# Patient Record
Sex: MALE | Race: BLACK OR AFRICAN AMERICAN | NOT HISPANIC OR LATINO | Employment: FULL TIME | URBAN - METROPOLITAN AREA
[De-identification: names, ages, dates, MRNs, and addresses within clinical notes are randomized per-mention and may not be internally consistent; named-entity substitution may affect disease eponyms.]

---

## 2017-08-09 ENCOUNTER — HOSPITAL ENCOUNTER (EMERGENCY)
Facility: OTHER | Age: 22
Discharge: HOME | End: 2017-08-09
Attending: INTERNAL MEDICINE
Payer: MEDICAID

## 2017-08-09 VITALS
HEART RATE: 66 BPM | OXYGEN SATURATION: 100 % | WEIGHT: 235 LBS | RESPIRATION RATE: 18 BRPM | SYSTOLIC BLOOD PRESSURE: 113 MMHG | DIASTOLIC BLOOD PRESSURE: 78 MMHG | HEIGHT: 67 IN | BODY MASS INDEX: 36.88 KG/M2 | TEMPERATURE: 98 F

## 2017-08-09 DIAGNOSIS — S60.221A CONTUSION OF RIGHT HAND, INITIAL ENCOUNTER: ICD-10-CM

## 2017-08-09 DIAGNOSIS — S63.91XA HAND SPRAIN, RIGHT, INITIAL ENCOUNTER: Primary | ICD-10-CM

## 2017-08-09 PROCEDURE — 25000003 PHARM REV CODE 250: Performed by: INTERNAL MEDICINE

## 2017-08-09 PROCEDURE — 99283 EMERGENCY DEPT VISIT LOW MDM: CPT

## 2017-08-09 RX ORDER — IBUPROFEN 800 MG/1
800 TABLET ORAL EVERY 8 HOURS PRN
Qty: 30 TABLET | Refills: 0 | OUTPATIENT
Start: 2017-08-09 | End: 2020-01-28

## 2017-08-09 RX ORDER — IBUPROFEN 400 MG/1
800 TABLET ORAL
Status: COMPLETED | OUTPATIENT
Start: 2017-08-09 | End: 2017-08-09

## 2017-08-09 RX ADMIN — IBUPROFEN 800 MG: 400 TABLET, FILM COATED ORAL at 09:08
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
## 2017-11-22 ENCOUNTER — HOSPITAL ENCOUNTER (EMERGENCY)
Facility: HOSPITAL | Age: 22
Discharge: HOME | End: 2017-11-23
Attending: EMERGENCY MEDICINE
Payer: MEDICAID

## 2017-11-22 DIAGNOSIS — G43.009 MIGRAINE WITHOUT AURA AND WITHOUT STATUS MIGRAINOSUS, NOT INTRACTABLE: Primary | ICD-10-CM

## 2017-11-22 PROCEDURE — 85025 COMPLETE CBC W/AUTO DIFF WBC: CPT

## 2017-11-22 PROCEDURE — 96360 HYDRATION IV INFUSION INIT: CPT

## 2017-11-22 PROCEDURE — 80053 COMPREHEN METABOLIC PANEL: CPT

## 2017-11-22 PROCEDURE — 83690 ASSAY OF LIPASE: CPT

## 2017-11-22 PROCEDURE — 99284 EMERGENCY DEPT VISIT MOD MDM: CPT | Mod: 25

## 2017-11-23 VITALS
OXYGEN SATURATION: 99 % | BODY MASS INDEX: 36.88 KG/M2 | HEIGHT: 67 IN | DIASTOLIC BLOOD PRESSURE: 79 MMHG | SYSTOLIC BLOOD PRESSURE: 133 MMHG | WEIGHT: 235 LBS | RESPIRATION RATE: 18 BRPM | TEMPERATURE: 100 F | HEART RATE: 78 BPM

## 2017-11-23 LAB
ALBUMIN SERPL BCP-MCNC: 4 G/DL
ALP SERPL-CCNC: 100 U/L
ALT SERPL W/O P-5'-P-CCNC: 59 U/L
AMPHET+METHAMPHET UR QL: NEGATIVE
ANION GAP SERPL CALC-SCNC: 10 MMOL/L
AST SERPL-CCNC: 33 U/L
BARBITURATES UR QL SCN>200 NG/ML: NEGATIVE
BASOPHILS # BLD AUTO: 0.02 K/UL
BASOPHILS NFR BLD: 0.2 %
BENZODIAZ UR QL SCN>200 NG/ML: NEGATIVE
BILIRUB SERPL-MCNC: 1.3 MG/DL
BILIRUB UR QL STRIP: NEGATIVE
BUN SERPL-MCNC: 11 MG/DL
BZE UR QL SCN: NEGATIVE
CALCIUM SERPL-MCNC: 9.3 MG/DL
CANNABINOIDS UR QL SCN: NORMAL
CHLORIDE SERPL-SCNC: 105 MMOL/L
CLARITY UR: CLEAR
CO2 SERPL-SCNC: 28 MMOL/L
COLOR UR: YELLOW
CREAT SERPL-MCNC: 1.2 MG/DL
CREAT UR-MCNC: 208.8 MG/DL
DIFFERENTIAL METHOD: ABNORMAL
EOSINOPHIL # BLD AUTO: 0.1 K/UL
EOSINOPHIL NFR BLD: 0.7 %
ERYTHROCYTE [DISTWIDTH] IN BLOOD BY AUTOMATED COUNT: 14.6 %
EST. GFR  (AFRICAN AMERICAN): >60 ML/MIN/1.73 M^2
EST. GFR  (NON AFRICAN AMERICAN): >60 ML/MIN/1.73 M^2
FLUAV AG SPEC QL IA: NEGATIVE
FLUBV AG SPEC QL IA: NEGATIVE
GLUCOSE SERPL-MCNC: 82 MG/DL
GLUCOSE UR QL STRIP: NEGATIVE
HCT VFR BLD AUTO: 46.2 %
HGB BLD-MCNC: 15.5 G/DL
HGB UR QL STRIP: NEGATIVE
KETONES UR QL STRIP: NEGATIVE
LEUKOCYTE ESTERASE UR QL STRIP: NEGATIVE
LIPASE SERPL-CCNC: 22 U/L
LYMPHOCYTES # BLD AUTO: 1.6 K/UL
LYMPHOCYTES NFR BLD: 14.6 %
MCH RBC QN AUTO: 27.8 PG
MCHC RBC AUTO-ENTMCNC: 33.5 G/DL
MCV RBC AUTO: 83 FL
METHADONE UR QL SCN>300 NG/ML: NEGATIVE
MONOCYTES # BLD AUTO: 0.9 K/UL
MONOCYTES NFR BLD: 8.5 %
NEUTROPHILS # BLD AUTO: 8.3 K/UL
NEUTROPHILS NFR BLD: 75.8 %
NITRITE UR QL STRIP: NEGATIVE
OPIATES UR QL SCN: NEGATIVE
PCP UR QL SCN>25 NG/ML: NEGATIVE
PH UR STRIP: 6 [PH] (ref 5–8)
PLATELET # BLD AUTO: 187 K/UL
PMV BLD AUTO: 11.8 FL
POTASSIUM SERPL-SCNC: 4 MMOL/L
PROT SERPL-MCNC: 7.3 G/DL
PROT UR QL STRIP: NEGATIVE
RBC # BLD AUTO: 5.57 M/UL
SODIUM SERPL-SCNC: 143 MMOL/L
SP GR UR STRIP: 1.02 (ref 1–1.03)
SPECIMEN SOURCE: NORMAL
TOXICOLOGY INFORMATION: NORMAL
URN SPEC COLLECT METH UR: ABNORMAL
UROBILINOGEN UR STRIP-ACNC: ABNORMAL EU/DL
WBC # BLD AUTO: 10.99 K/UL

## 2017-11-23 PROCEDURE — 87400 INFLUENZA A/B EACH AG IA: CPT

## 2017-11-23 PROCEDURE — 80307 DRUG TEST PRSMV CHEM ANLYZR: CPT

## 2017-11-23 PROCEDURE — 25000003 PHARM REV CODE 250: Performed by: EMERGENCY MEDICINE

## 2017-11-23 PROCEDURE — 81003 URINALYSIS AUTO W/O SCOPE: CPT

## 2017-11-23 RX ORDER — ONDANSETRON 4 MG/1
4 TABLET, ORALLY DISINTEGRATING ORAL
Status: DISCONTINUED | OUTPATIENT
Start: 2017-11-23 | End: 2017-11-23

## 2017-11-23 RX ADMIN — SODIUM CHLORIDE 1000 ML: 0.9 INJECTION, SOLUTION INTRAVENOUS at 12:11
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
## 2017-11-23 NOTE — DISCHARGE INSTRUCTIONS
Your blood work did not show any serious abnormalities and her symptoms improved with IV hydration.  It is very important to drink water to stay hydrated.  When you're having a headache you can take over-the-counter Tylenol or ibuprofen.  Make sure to have Food in your stomach if you are taking ibuprofen.  It is very important to make an appointment with a primary care physician for a full checkup.  Return to the ER for any new or worsening symptoms such as numbness, weakness, difficulty with speech, or fever

## 2019-06-08 ENCOUNTER — HOSPITAL ENCOUNTER (EMERGENCY)
Facility: HOSPITAL | Age: 24
Discharge: HOME | End: 2019-06-08
Attending: EMERGENCY MEDICINE

## 2019-06-08 VITALS
DIASTOLIC BLOOD PRESSURE: 76 MMHG | OXYGEN SATURATION: 100 % | HEART RATE: 62 BPM | RESPIRATION RATE: 18 BRPM | HEIGHT: 67 IN | WEIGHT: 240 LBS | BODY MASS INDEX: 37.67 KG/M2 | SYSTOLIC BLOOD PRESSURE: 136 MMHG | TEMPERATURE: 99 F

## 2019-06-08 DIAGNOSIS — S60.222A CONTUSION OF LEFT HAND, INITIAL ENCOUNTER: ICD-10-CM

## 2019-06-08 DIAGNOSIS — M54.50 ACUTE LEFT-SIDED LOW BACK PAIN WITHOUT SCIATICA: ICD-10-CM

## 2019-06-08 DIAGNOSIS — S01.81XA FACIAL LACERATION, INITIAL ENCOUNTER: Primary | ICD-10-CM
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
## 2019-06-08 DIAGNOSIS — T14.8XXA CONTUSION: ICD-10-CM

## 2019-06-08 PROCEDURE — 63600175 PHARM REV CODE 636 W HCPCS: Performed by: PHYSICIAN ASSISTANT

## 2019-06-08 PROCEDURE — 90715 TDAP VACCINE 7 YRS/> IM: CPT | Performed by: PHYSICIAN ASSISTANT

## 2019-06-08 PROCEDURE — 99284 EMERGENCY DEPT VISIT MOD MDM: CPT | Mod: 25

## 2019-06-08 PROCEDURE — 25000003 PHARM REV CODE 250: Performed by: PHYSICIAN ASSISTANT

## 2019-06-08 PROCEDURE — 96372 THER/PROPH/DIAG INJ SC/IM: CPT

## 2019-06-08 PROCEDURE — 12011 RPR F/E/E/N/L/M 2.5 CM/<: CPT | Mod: 59

## 2019-06-08 PROCEDURE — 90471 IMMUNIZATION ADMIN: CPT | Performed by: PHYSICIAN ASSISTANT

## 2019-06-08 RX ORDER — ACETAMINOPHEN 500 MG
1000 TABLET ORAL
Status: COMPLETED | OUTPATIENT
Start: 2019-06-08 | End: 2019-06-08

## 2019-06-08 RX ORDER — ORPHENADRINE CITRATE 30 MG/ML
60 INJECTION INTRAMUSCULAR; INTRAVENOUS
Status: COMPLETED | OUTPATIENT
Start: 2019-06-08 | End: 2019-06-08

## 2019-06-08 RX ORDER — METHOCARBAMOL 500 MG/1
1000 TABLET, FILM COATED ORAL 3 TIMES DAILY
Qty: 30 TABLET | Refills: 0 | Status: SHIPPED | OUTPATIENT
Start: 2019-06-08 | End: 2019-06-13

## 2019-06-08 RX ADMIN — CLOSTRIDIUM TETANI TOXOID ANTIGEN (FORMALDEHYDE INACTIVATED), CORYNEBACTERIUM DIPHTHERIAE TOXOID ANTIGEN (FORMALDEHYDE INACTIVATED), BORDETELLA PERTUSSIS TOXOID ANTIGEN (GLUTARALDEHYDE INACTIVATED), BORDETELLA PERTUSSIS FILAMENTOUS HEMAGGLUTININ ANTIGEN (FORMALDEHYDE INACTIVATED), BORDETELLA PERTUSSIS PERTACTIN ANTIGEN, AND BORDETELLA PERTUSSIS FIMBRIAE 2/3 ANTIGEN 0.5 ML: 5; 2; 2.5; 5; 3; 5 INJECTION, SUSPENSION INTRAMUSCULAR at 12:06

## 2019-06-08 RX ADMIN — ACETAMINOPHEN 1000 MG: 500 TABLET, FILM COATED ORAL at 12:06

## 2019-06-08 RX ADMIN — ORPHENADRINE CITRATE 60 MG: 60 INJECTION INTRAMUSCULAR; INTRAVENOUS at 02:06

## 2019-06-08 RX ADMIN — LIDOCAINE-EPINEPHRINE-TETRACAINE GEL 4-0.05-0.5%: 4-0.05-0.5 GEL at 12:06

## 2019-06-08 NOTE — DISCHARGE INSTRUCTIONS
Keep the cut dry and clean.  Watch for signs of infection including redness, swelling, drainage, fever chills.  Tylenol or Motrin for pain if needed.  Use the ice or heat pack to your back.
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
## 2020-01-28 ENCOUNTER — HOSPITAL ENCOUNTER (EMERGENCY)
Facility: HOSPITAL | Age: 25
Discharge: HOME | End: 2020-01-28
Attending: EMERGENCY MEDICINE
Payer: MEDICAID

## 2020-01-28 VITALS
SYSTOLIC BLOOD PRESSURE: 136 MMHG | WEIGHT: 245 LBS | BODY MASS INDEX: 38.45 KG/M2 | OXYGEN SATURATION: 98 % | RESPIRATION RATE: 18 BRPM | DIASTOLIC BLOOD PRESSURE: 83 MMHG | HEIGHT: 67 IN | HEART RATE: 61 BPM | TEMPERATURE: 99 F
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
## 2020-01-28 DIAGNOSIS — R03.0 ELEVATED BLOOD PRESSURE READING: ICD-10-CM

## 2020-01-28 DIAGNOSIS — M79.601 RIGHT ARM PAIN: ICD-10-CM

## 2020-01-28 PROCEDURE — 99284 EMERGENCY DEPT VISIT MOD MDM: CPT | Mod: 25

## 2020-01-28 PROCEDURE — 63600175 PHARM REV CODE 636 W HCPCS: Performed by: PHYSICIAN ASSISTANT

## 2020-01-28 PROCEDURE — 96372 THER/PROPH/DIAG INJ SC/IM: CPT | Mod: 59

## 2020-01-28 RX ORDER — METHOCARBAMOL 500 MG/1
1000 TABLET, FILM COATED ORAL 3 TIMES DAILY
Qty: 30 TABLET | Refills: 0 | Status: SHIPPED | OUTPATIENT
Start: 2020-01-28 | End: 2020-02-02

## 2020-01-28 RX ORDER — KETOROLAC TROMETHAMINE 30 MG/ML
30 INJECTION, SOLUTION INTRAMUSCULAR; INTRAVENOUS
Status: COMPLETED | OUTPATIENT
Start: 2020-01-28 | End: 2020-01-28

## 2020-01-28 RX ORDER — IBUPROFEN 600 MG/1
600 TABLET ORAL EVERY 6 HOURS PRN
Qty: 20 TABLET | Refills: 0 | OUTPATIENT
Start: 2020-01-28 | End: 2021-02-02

## 2020-01-28 RX ADMIN — KETOROLAC TROMETHAMINE 30 MG: 30 INJECTION, SOLUTION INTRAMUSCULAR; INTRAVENOUS at 11:01

## 2020-01-28 NOTE — DISCHARGE INSTRUCTIONS
Rest.  Apply ice or heat to the areas for pain relief.  Return to the ED for any changing or concerning symptoms.  Please follow up with your PCP.
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
## 2020-07-09 ENCOUNTER — HOSPITAL ENCOUNTER (EMERGENCY)
Facility: HOSPITAL | Age: 25
Discharge: HOME | End: 2020-07-10
Attending: EMERGENCY MEDICINE
Payer: MEDICAID

## 2020-07-09 DIAGNOSIS — S89.91XA RIGHT KNEE INJURY: ICD-10-CM

## 2020-07-09 DIAGNOSIS — M25.561 ACUTE PAIN OF RIGHT KNEE: Primary | ICD-10-CM

## 2020-07-09 PROCEDURE — 96372 THER/PROPH/DIAG INJ SC/IM: CPT | Mod: 59

## 2020-07-09 PROCEDURE — 29505 APPLICATION LONG LEG SPLINT: CPT | Mod: RT

## 2020-07-09 PROCEDURE — 99284 EMERGENCY DEPT VISIT MOD MDM: CPT | Mod: 25

## 2020-07-09 PROCEDURE — 63600175 PHARM REV CODE 636 W HCPCS: Performed by: PHYSICIAN ASSISTANT

## 2020-07-09 RX ORDER — HYDROMORPHONE HYDROCHLORIDE 2 MG/ML
1 INJECTION, SOLUTION INTRAMUSCULAR; INTRAVENOUS; SUBCUTANEOUS
Status: COMPLETED | OUTPATIENT
Start: 2020-07-09 | End: 2020-07-09

## 2020-07-09 RX ADMIN — HYDROMORPHONE HYDROCHLORIDE 1 MG: 2 INJECTION, SOLUTION INTRAMUSCULAR; INTRAVENOUS; SUBCUTANEOUS at 11:07

## 2020-07-10 VITALS
HEIGHT: 67 IN | OXYGEN SATURATION: 100 % | BODY MASS INDEX: 37.83 KG/M2 | RESPIRATION RATE: 18 BRPM | HEART RATE: 67 BPM | WEIGHT: 241 LBS | DIASTOLIC BLOOD PRESSURE: 77 MMHG | TEMPERATURE: 99 F | SYSTOLIC BLOOD PRESSURE: 143 MMHG

## 2020-07-10 RX ORDER — OXYCODONE AND ACETAMINOPHEN 5; 325 MG/1; MG/1
1 TABLET ORAL EVERY 4 HOURS PRN
Qty: 6 TABLET | Refills: 0 | OUTPATIENT
Start: 2020-07-10 | End: 2021-02-02
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
## 2020-09-29 ENCOUNTER — HOSPITAL ENCOUNTER (EMERGENCY)
Facility: HOSPITAL | Age: 25
Discharge: HOME | End: 2020-09-29
Attending: EMERGENCY MEDICINE
Payer: MEDICAID

## 2020-09-29 VITALS
RESPIRATION RATE: 17 BRPM | HEIGHT: 67 IN | WEIGHT: 240 LBS | HEART RATE: 59 BPM | OXYGEN SATURATION: 100 % | TEMPERATURE: 99 F | BODY MASS INDEX: 37.67 KG/M2 | DIASTOLIC BLOOD PRESSURE: 91 MMHG | SYSTOLIC BLOOD PRESSURE: 165 MMHG

## 2020-09-29 DIAGNOSIS — R33.9 URINARY RETENTION: Primary | ICD-10-CM

## 2020-09-29 LAB
BILIRUB UR QL STRIP: NEGATIVE
CLARITY UR: CLEAR
COLOR UR: YELLOW
GLUCOSE UR QL STRIP: NEGATIVE
HGB UR QL STRIP: NEGATIVE
KETONES UR QL STRIP: NEGATIVE
LEUKOCYTE ESTERASE UR QL STRIP: NEGATIVE
NITRITE UR QL STRIP: NEGATIVE
PH UR STRIP: 5 [PH] (ref 5–8)
PROT UR QL STRIP: NEGATIVE
SP GR UR STRIP: 1.02 (ref 1–1.03)
URN SPEC COLLECT METH UR: NORMAL
UROBILINOGEN UR STRIP-ACNC: NEGATIVE EU/DL

## 2020-09-29 PROCEDURE — 81003 URINALYSIS AUTO W/O SCOPE: CPT

## 2020-09-29 PROCEDURE — 51702 INSERT TEMP BLADDER CATH: CPT

## 2020-09-29 PROCEDURE — 99283 EMERGENCY DEPT VISIT LOW MDM: CPT | Mod: 25

## 2020-09-30 ENCOUNTER — PATIENT OUTREACH (OUTPATIENT)
Dept: EMERGENCY MEDICINE | Facility: HOSPITAL | Age: 25
End: 2020-09-30

## 2020-09-30 NOTE — ED NOTES
Pt in the semi- castanon's position talking to his dad. Pt denies distress, states the purdy catheter is uncomfortable. VSS. Will continue to monitor closely.

## 2020-09-30 NOTE — DISCHARGE INSTRUCTIONS
Thank you for coming to our Emergency Department today. It is important to remember that some problems are difficult to diagnose and may not be found during your first visit. Be sure to follow up with your primary care doctor and review any labs/imaging that was performed with them. If you do not have a primary care doctor, you may contact the one listed on your discharge paperwork or you may also call the [facility] Appointment Desk at [phone] to schedule an appointment with one.     All medications may potentially have side effects and it is impossible to predict which medications may give you side effects. If you feel that you are having a negative effect of any medication you should immediately stop taking them and seek medical attention.    Return to the ER with any questions/concerns, new/concerning symptoms, worsening or failure to improve. Do not drive or make any important decisions for 24 hours if you have received any pain medications, sedatives or mood altering drugs during your ER visit.

## 2020-09-30 NOTE — ED PROVIDER NOTES
Encounter Date: 9/29/2020    SCRIBE #1 NOTE: I, Sallie Candelaria, am scribing for, and in the presence of,  Tariq Pizano MD. I have scribed the following portions of the note - Other sections scribed: HPI, ROS, PE.       History     Chief Complaint   Patient presents with    Dysuria     Patient reports being D/C from knee surgery on today. Patient reports since D/C he has been unable to void     CC: Urinary rentention     Patient is a 25 year old male s/p arthroscopy knee with ACL repair who presents to the ED complaining of urinary retention that began today. Patient states his symptoms began following the surgery this morning. The procedure was performed by Dr. Fredi Aquino MD. He reports he last urinated at 7 am prior to his surgery. He denies fever, CP, SOB, nausea, emesis, weakness, and any other associated symptoms. No recent falls or trips. No known PMHx. NKDA. SHx of smoking tobacco products.    The history is provided by the patient.     Review of patient's allergies indicates:  No Known Allergies  No past medical history on file.  Past Surgical History:   Procedure Laterality Date    CIRCUMCISION, PRIMARY       Family History   Problem Relation Age of Onset    Hypertension Mother      Social History     Tobacco Use    Smoking status: Never Smoker    Smokeless tobacco: Never Used   Substance Use Topics    Alcohol use: No    Drug use: Yes     Types: Marijuana     Comment: 2 x a week     Review of Systems   Constitutional: Negative for chills and fever.   HENT: Negative for congestion and sore throat.    Eyes: Negative for pain and visual disturbance.   Cardiovascular: Negative for chest pain.   Gastrointestinal: Negative for nausea.   Endocrine: Negative for polydipsia and polyuria.   Genitourinary: Positive for difficulty urinating. Negative for dysuria and flank pain.   Musculoskeletal: Negative for back pain, neck pain and neck stiffness.   Skin: Negative for rash.   Allergic/Immunologic: Negative  for immunocompromised state.   Neurological: Negative for dizziness and weakness.   Hematological: Does not bruise/bleed easily.   Psychiatric/Behavioral: Negative for agitation and behavioral problems.   All other systems reviewed and are negative.      Physical Exam     Initial Vitals [09/29/20 2001]   BP Pulse Resp Temp SpO2   (!) 150/92 63 17 98.5 °F (36.9 °C) 99 %      MAP       --         Physical Exam    Nursing note and vitals reviewed.  Constitutional: He appears well-developed and well-nourished.   HENT:   Head: Normocephalic.   Right Ear: External ear normal.   Left Ear: External ear normal.   Mouth/Throat: Oropharynx is clear and moist.   Eyes: EOM are normal. Pupils are equal, round, and reactive to light.   Neck: Normal range of motion.   Cardiovascular: Normal rate and regular rhythm.   Pulmonary/Chest: Breath sounds normal. No respiratory distress. He has no wheezes.   Abdominal: Soft. Bowel sounds are normal. He exhibits no distension. There is no abdominal tenderness.   Genitourinary:    Genitourinary Comments: Normal testicular exam. Distended bladder on US upon physical exam.     Musculoskeletal: Normal range of motion. No tenderness or edema.   Neurological: He is alert and oriented to person, place, and time. He has normal strength. GCS score is 15. GCS eye subscore is 4. GCS verbal subscore is 5. GCS motor subscore is 6.   Skin: Skin is warm and dry. Capillary refill takes less than 2 seconds.   Psychiatric: He has a normal mood and affect. Thought content normal.         ED Course   Procedures  Labs Reviewed   URINALYSIS, REFLEX TO URINE CULTURE    Narrative:     Specimen Source->Urine          Imaging Results    None          Medical Decision Making:   History:   Old Medical Records: I decided to obtain old medical records.  Initial Assessment:   25-year-old male presenting secondary urinary retention.  Marks placed.  Symptoms resolve.  UA negative.  I spoke with Dr. Aquino with Orthopedics  who did surgery.  Patient follow-up care away with Urology or whoever Dr. Aquino recommends with Urology.  He states that he will call the patient tomorrow morning. I discussed with the patient/family the diagnosis, treatment plan, indications for return to the emergency department, and for expected follow-up. The patient/family verbalized an understanding. The patient/family is asked if there are any questions or concerns. We discuss the case, until all issues are addressed to the patient/familys satisfaction. Patient/family understands and is agreeable to the plan.   Tariq Feliciano      Clinical Tests:   Lab Tests: Ordered and Reviewed            Scribe Attestation:   Scribe #1: I performed the above scribed service and the documentation accurately describes the services I performed. I attest to the accuracy of the note.                      Clinical Impression:     ICD-10-CM ICD-9-CM   1. Urinary retention  R33.9 788.20                          ED Disposition Condition    Discharge Stable        ED Prescriptions     None        Follow-up Information     Follow up With Specialties Details Why Contact Info    CLYDE Menendez MD Urology Schedule an appointment as soon as possible for a visit in 3 days  [address]  [address]  [facility] [address]  [phone]                        I, tariq feliciano, personally performed the services described in this documentation. All medical record entries made by the scribe were at my direction and in my presence.  I have reviewed the chart and agree that the record reflects my personal performance and is accurate and complete.                   Tariq Feliciano MD  09/29/20 9350

## 2020-10-07 ENCOUNTER — OFFICE VISIT (OUTPATIENT)
Dept: UROLOGY | Facility: CLINIC | Age: 25
End: 2020-10-07
Payer: MEDICAID

## 2020-10-07 VITALS
WEIGHT: 240 LBS | BODY MASS INDEX: 37.67 KG/M2 | SYSTOLIC BLOOD PRESSURE: 140 MMHG | HEIGHT: 67 IN | DIASTOLIC BLOOD PRESSURE: 100 MMHG

## 2020-10-07 DIAGNOSIS — R33.9 URINARY RETENTION: Primary | ICD-10-CM

## 2020-10-07 PROCEDURE — 51700 PR IRRIGATION, BLADDER: ICD-10-PCS | Mod: S$PBB,,, | Performed by: NURSE PRACTITIONER

## 2020-10-07 PROCEDURE — 99999 PR PBB SHADOW E&M-EST. PATIENT-LVL III: ICD-10-PCS | Mod: PBBFAC,,, | Performed by: NURSE PRACTITIONER

## 2020-10-07 PROCEDURE — 99213 OFFICE O/P EST LOW 20 MIN: CPT | Mod: PBBFAC | Performed by: NURSE PRACTITIONER

## 2020-10-07 PROCEDURE — 51700 IRRIGATION OF BLADDER: CPT | Mod: S$PBB,,, | Performed by: NURSE PRACTITIONER

## 2020-10-07 PROCEDURE — 99499 UNLISTED E&M SERVICE: CPT | Mod: S$PBB,,, | Performed by: NURSE PRACTITIONER

## 2020-10-07 PROCEDURE — 99999 PR PBB SHADOW E&M-EST. PATIENT-LVL III: CPT | Mod: PBBFAC,,, | Performed by: NURSE PRACTITIONER

## 2020-10-07 PROCEDURE — 51700 IRRIGATION OF BLADDER: CPT | Mod: PBBFAC | Performed by: NURSE PRACTITIONER

## 2020-10-07 PROCEDURE — 99499 NO LOS: ICD-10-PCS | Mod: S$PBB,,, | Performed by: NURSE PRACTITIONER

## 2020-10-07 NOTE — PROGRESS NOTES
Subjective:       Patient ID: Rachel Jonas is a 25 y.o. male who is a new patient was referred by Tariq Pizano MD for urinary retention    Chief Complaint:   Chief Complaint   Patient presents with    Other     Pt. is here for a VOT to be performed and the process was explained to him and he understands fully.. pt. had a recent surgery and he states after he couldn't urinate        Urinary Retention  He presented to the ED on 9/29/20 with urinary retention. He is s/p R ACL repair on 9/29/20 by Dr Antony Aquino.  Patient currently does have a urinary catheter in place.  An unknown amount of urine were drained when catheter was placed. Prior to this event voiding symptoms consisted of none. Prior treatments include none. Recent medications that may have affected his voiding include anesthesia. Reports strong urinary stream at baseline. Denies nocturia.  Denies previous occurrence of urinary retention    He is here today for a voiding trial. He is tolerating his purdy. Denies gross hematuria, pain or fever. He is eager to have his catheter removed      ACTIVE MEDICAL ISSUES:  Patient Active Problem List   Diagnosis    Sprain of right hand       PAST MEDICAL HISTORY  History reviewed. No pertinent past medical history.    PAST SURGICAL HISTORY:  Past Surgical History:   Procedure Laterality Date    CIRCUMCISION, PRIMARY         SOCIAL HISTORY:  Social History     Tobacco Use    Smoking status: Never Smoker    Smokeless tobacco: Never Used   Substance Use Topics    Alcohol use: No    Drug use: Yes     Types: Marijuana     Comment: 2 x a week       FAMILY HISTORY:  Family History   Problem Relation Age of Onset    Hypertension Mother        ALLERGIES AND MEDICATIONS: updated and reviewed.  Review of patient's allergies indicates:  No Known Allergies  Current Outpatient Medications   Medication Sig    ibuprofen (ADVIL,MOTRIN) 600 MG tablet Take 1 tablet (600 mg total) by mouth every 6 (six) hours as needed for  "Pain.    oxyCODONE-acetaminophen (PERCOCET) 5-325 mg per tablet Take 1 tablet by mouth every 4 (four) hours as needed for Pain.     No current facility-administered medications for this visit.        Review of Systems   Constitutional: Negative for activity change, chills, fatigue, fever and unexpected weight change.   Eyes: Negative for discharge, redness and visual disturbance.   Respiratory: Negative for cough, shortness of breath and wheezing.    Cardiovascular: Negative for chest pain and leg swelling.   Gastrointestinal: Negative for abdominal distention, abdominal pain, constipation, diarrhea, nausea and vomiting.   Genitourinary: Negative for decreased urine volume, discharge, dysuria, flank pain, frequency, hematuria, penile swelling, scrotal swelling, testicular pain and urgency.   Musculoskeletal: Negative for arthralgias, joint swelling and myalgias.   Skin: Negative for color change and rash.   Neurological: Negative for dizziness and light-headedness.   Psychiatric/Behavioral: Negative for behavioral problems and confusion. The patient is not nervous/anxious.        Objective:      Vitals:    10/07/20 0821   BP: (!) 140/100   Weight: 108.9 kg (240 lb)   Height: 5' 7" (1.702 m)     Physical Exam   Constitutional: He is oriented to person, place, and time. He appears well-developed.   HENT:   Head: Normocephalic and atraumatic.   Nose: Nose normal.   Eyes: Conjunctivae are normal. Right eye exhibits no discharge. Left eye exhibits no discharge.   Neck: Normal range of motion. Neck supple. No tracheal deviation present. No thyromegaly present.   Cardiovascular: Normal rate and regular rhythm.    Pulmonary/Chest: Effort normal. No respiratory distress. He has no wheezes.   Abdominal: Soft. He exhibits no distension. There is no abdominal tenderness. No hernia.   Genitourinary:    Genitourinary Comments: 16 Fr purdy draining yellow urine     Musculoskeletal:      Comments: Dulce noted to RLE "   Neurological: He is alert and oriented to person, place, and time.   Skin: Skin is warm and dry. No rash noted. No erythema.     Psychiatric: His behavior is normal. Judgment normal.         Urine dipstick shows not done.     Voiding Trial: 180cc instilled, 200cc voided    Assessment:       1. Urinary retention          Plan:       1. Urinary retention  -Likely due to recent anesthesia  -No LUTS prior to surgery  - Voiding Trial--successful  -Adequate hydration  -Avoid/treat constipation            Follow up in about 4 weeks (around 11/4/2020) for Follow up PVR.

## 2020-11-05 ENCOUNTER — OFFICE VISIT (OUTPATIENT)
Dept: UROLOGY | Facility: CLINIC | Age: 25
End: 2020-11-05
Payer: MEDICAID

## 2020-11-05 VITALS
DIASTOLIC BLOOD PRESSURE: 80 MMHG | BODY MASS INDEX: 37.67 KG/M2 | HEIGHT: 67 IN | SYSTOLIC BLOOD PRESSURE: 102 MMHG | WEIGHT: 240 LBS

## 2020-11-05 DIAGNOSIS — R33.9 URINARY RETENTION: Primary | ICD-10-CM

## 2020-11-05 PROCEDURE — 99999 PR PBB SHADOW E&M-EST. PATIENT-LVL III: ICD-10-PCS | Mod: PBBFAC,,, | Performed by: NURSE PRACTITIONER

## 2020-11-05 PROCEDURE — 99213 OFFICE O/P EST LOW 20 MIN: CPT | Mod: PBBFAC,25 | Performed by: NURSE PRACTITIONER

## 2020-11-05 PROCEDURE — 99999 PR PBB SHADOW E&M-EST. PATIENT-LVL III: CPT | Mod: PBBFAC,,, | Performed by: NURSE PRACTITIONER

## 2020-11-05 PROCEDURE — 51798 US URINE CAPACITY MEASURE: CPT | Mod: PBBFAC | Performed by: NURSE PRACTITIONER

## 2020-11-05 PROCEDURE — 99213 OFFICE O/P EST LOW 20 MIN: CPT | Mod: S$PBB,,, | Performed by: NURSE PRACTITIONER

## 2020-11-05 PROCEDURE — 99213 PR OFFICE/OUTPT VISIT, EST, LEVL III, 20-29 MIN: ICD-10-PCS | Mod: S$PBB,,, | Performed by: NURSE PRACTITIONER

## 2020-11-05 NOTE — PROGRESS NOTES
Subjective:       Patient ID: Rachel Jonas is a 25 y.o. male who was last seen in this office 10/7/2020    Chief Complaint:   Chief Complaint   Patient presents with    Follow-up     Pt. is here for a follow up and states everything is well no new complaints       Urinary Retention  He presented to the ED on 9/29/20 with urinary retention. He is s/p R ACL repair on 9/29/20 by Dr Antony qAuino.  Patient currently does have a urinary catheter in place.  An unknown amount of urine were drained when catheter was placed. Prior to this event voiding symptoms consisted of none. Prior treatments include none. Recent medications that may have affected his voiding include anesthesia. Reports strong urinary stream at baseline. Denies nocturia.  Denies previous occurrence of urinary retention    Voiding trial on 10/7/20 in this office was successful. He is here today for a PVR check. He is voiding well. No new complaints    PVR (bladder scan) today - 0 ml (not true PVR, last void 2 hrs ago)    ACTIVE MEDICAL ISSUES:  Patient Active Problem List   Diagnosis    Sprain of right hand       ALLERGIES AND MEDICATIONS: updated and reviewed.  Review of patient's allergies indicates:  No Known Allergies  Current Outpatient Medications   Medication Sig    ibuprofen (ADVIL,MOTRIN) 600 MG tablet Take 1 tablet (600 mg total) by mouth every 6 (six) hours as needed for Pain.    oxyCODONE-acetaminophen (PERCOCET) 5-325 mg per tablet Take 1 tablet by mouth every 4 (four) hours as needed for Pain.     No current facility-administered medications for this visit.        Review of Systems   Constitutional: Negative for activity change, chills, fatigue, fever and unexpected weight change.   Eyes: Negative for discharge, redness and visual disturbance.   Respiratory: Negative for cough, shortness of breath and wheezing.    Cardiovascular: Negative for chest pain and leg swelling.   Gastrointestinal: Negative for abdominal distention, abdominal  "pain, constipation, diarrhea, nausea and vomiting.   Genitourinary: Negative for decreased urine volume, difficulty urinating, discharge, dysuria, flank pain, frequency, hematuria, penile pain, scrotal swelling, testicular pain and urgency.   Musculoskeletal: Negative for arthralgias, joint swelling and myalgias.   Skin: Negative for color change and rash.   Neurological: Negative for dizziness and light-headedness.   Psychiatric/Behavioral: Negative for behavioral problems and confusion. The patient is not nervous/anxious.        Objective:      Vitals:    11/05/20 1100   BP: 102/80   Weight: 108.9 kg (240 lb)   Height: 5' 7" (1.702 m)     Physical Exam  Constitutional:       Appearance: He is well-developed.   HENT:      Head: Normocephalic and atraumatic.      Nose: Nose normal.   Eyes:      General:         Right eye: No discharge.         Left eye: No discharge.      Conjunctiva/sclera: Conjunctivae normal.   Neck:      Musculoskeletal: Normal range of motion and neck supple.      Thyroid: No thyromegaly.      Trachea: No tracheal deviation.   Cardiovascular:      Rate and Rhythm: Normal rate and regular rhythm.   Pulmonary:      Effort: Pulmonary effort is normal. No respiratory distress.      Breath sounds: No wheezing.   Abdominal:      General: There is no distension.      Palpations: Abdomen is soft.      Tenderness: There is no abdominal tenderness.      Hernia: No hernia is present.   Genitourinary:     Comments: Patient declined exam  Musculoskeletal: Normal range of motion.   Skin:     General: Skin is warm and dry.      Findings: No erythema or rash.   Neurological:      Mental Status: He is alert and oriented to person, place, and time.   Psychiatric:         Behavior: Behavior normal.         Judgment: Judgment normal.         Urine dipstick shows patient unable to produce specimen.      Assessment:       1. Urinary retention          Plan:       1. Urinary retention  -Resolved  -Likely due to " anesthesia  - POCT Bladder Scan            Follow up if symptoms worsen or fail to improve.

## 2020-11-06 NOTE — PROGRESS NOTES
Attempted to contact patient on 3 separate occasions, patient is unable to reach. ED Navigator to close encounter at this time.      Charline Dao, ED Navigator, [facility]  [phone], ext. [phone]

## 2021-02-02 ENCOUNTER — HOSPITAL ENCOUNTER (EMERGENCY)
Facility: HOSPITAL | Age: 26
Discharge: HOME | End: 2021-02-02
Attending: EMERGENCY MEDICINE
Payer: MEDICAID

## 2021-02-02 VITALS
WEIGHT: 245 LBS | HEIGHT: 66 IN | SYSTOLIC BLOOD PRESSURE: 151 MMHG | BODY MASS INDEX: 39.37 KG/M2 | RESPIRATION RATE: 20 BRPM | DIASTOLIC BLOOD PRESSURE: 84 MMHG | TEMPERATURE: 99 F | HEART RATE: 100 BPM | OXYGEN SATURATION: 100 %

## 2021-02-02 DIAGNOSIS — Z77.098 CHEMICAL EXPOSURE: Primary | ICD-10-CM

## 2021-02-02 PROCEDURE — 99283 EMERGENCY DEPT VISIT LOW MDM: CPT | Mod: 25,ER

## 2021-07-02 ENCOUNTER — HOSPITAL ENCOUNTER (EMERGENCY)
Facility: HOSPITAL | Age: 26
Discharge: HOME | End: 2021-07-02
Attending: EMERGENCY MEDICINE
Payer: MEDICAID

## 2021-07-02 VITALS
RESPIRATION RATE: 18 BRPM | WEIGHT: 243 LBS | OXYGEN SATURATION: 99 % | SYSTOLIC BLOOD PRESSURE: 124 MMHG | HEART RATE: 62 BPM | TEMPERATURE: 99 F | BODY MASS INDEX: 39.05 KG/M2 | DIASTOLIC BLOOD PRESSURE: 63 MMHG | HEIGHT: 66 IN

## 2021-07-02 DIAGNOSIS — M25.561 ACUTE POSTOPERATIVE PAIN OF RIGHT KNEE: ICD-10-CM
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
## 2021-07-02 DIAGNOSIS — G89.18 ACUTE POSTOPERATIVE PAIN OF RIGHT KNEE: ICD-10-CM

## 2021-07-02 DIAGNOSIS — G89.18 POSTOPERATIVE PAIN: Primary | ICD-10-CM

## 2021-07-02 LAB
ALBUMIN SERPL-MCNC: 3.9 G/DL (ref 3.3–5.5)
ALP SERPL-CCNC: 63 U/L (ref 42–141)
BILIRUB SERPL-MCNC: 1.3 MG/DL (ref 0.2–1.6)
BUN SERPL-MCNC: 9 MG/DL (ref 7–22)
CALCIUM SERPL-MCNC: 9.4 MG/DL (ref 8–10.3)
CHLORIDE SERPL-SCNC: 108 MMOL/L (ref 98–108)
CREAT SERPL-MCNC: 1.1 MG/DL (ref 0.6–1.2)
CTP QC/QA: YES
GLUCOSE SERPL-MCNC: 100 MG/DL (ref 73–118)
POC ALT (SGPT): 36 U/L (ref 10–47)
POC AST (SGOT): 33 U/L (ref 11–38)
POC RAPID STREP A: NEGATIVE
POC TCO2: 28 MMOL/L (ref 18–33)
POTASSIUM BLD-SCNC: 3.7 MMOL/L (ref 3.6–5.1)
PROTEIN, POC: 7.2 G/DL (ref 6.4–8.1)
SARS-COV-2 RDRP RESP QL NAA+PROBE: NEGATIVE
SODIUM BLD-SCNC: 144 MMOL/L (ref 128–145)

## 2021-07-02 PROCEDURE — 25000003 PHARM REV CODE 250: Mod: ER | Performed by: EMERGENCY MEDICINE

## 2021-07-02 PROCEDURE — 36000 PLACE NEEDLE IN VEIN: CPT | Mod: ER

## 2021-07-02 PROCEDURE — 93005 ELECTROCARDIOGRAM TRACING: CPT | Mod: ER

## 2021-07-02 PROCEDURE — 85025 COMPLETE CBC W/AUTO DIFF WBC: CPT | Mod: ER

## 2021-07-02 PROCEDURE — 81003 URINALYSIS AUTO W/O SCOPE: CPT | Mod: ER

## 2021-07-02 PROCEDURE — 80053 COMPREHEN METABOLIC PANEL: CPT | Mod: ER

## 2021-07-02 PROCEDURE — 99285 EMERGENCY DEPT VISIT HI MDM: CPT | Mod: 25,ER

## 2021-07-02 PROCEDURE — 93010 ELECTROCARDIOGRAM REPORT: CPT | Mod: ,,, | Performed by: INTERNAL MEDICINE

## 2021-07-02 PROCEDURE — U0002 COVID-19 LAB TEST NON-CDC: HCPCS | Mod: ER | Performed by: EMERGENCY MEDICINE

## 2021-07-02 PROCEDURE — 93010 EKG 12-LEAD: ICD-10-PCS | Mod: ,,, | Performed by: INTERNAL MEDICINE

## 2021-07-02 RX ORDER — IBUPROFEN 600 MG/1
600 TABLET ORAL EVERY 6 HOURS PRN
Qty: 20 TABLET | Refills: 0 | Status: SHIPPED | OUTPATIENT
Start: 2021-07-02 | End: 2022-04-27

## 2021-07-02 RX ORDER — ACETAMINOPHEN 500 MG
1000 TABLET ORAL EVERY 6 HOURS PRN
Qty: 30 TABLET | Refills: 0 | Status: SHIPPED | OUTPATIENT
Start: 2021-07-02 | End: 2023-07-03

## 2021-07-02 RX ORDER — LIDOCAINE HYDROCHLORIDE 20 MG/ML
10 SOLUTION OROPHARYNGEAL
Status: COMPLETED | OUTPATIENT
Start: 2021-07-02 | End: 2021-07-02

## 2021-07-02 RX ADMIN — LIDOCAINE HYDROCHLORIDE 10 ML: 20 SOLUTION ORAL at 11:07

## 2021-08-25 ENCOUNTER — TELEPHONE (OUTPATIENT)
Dept: SPORTS MEDICINE | Facility: CLINIC | Age: 26
End: 2021-08-25

## 2021-08-27 ENCOUNTER — TELEPHONE (OUTPATIENT)
Dept: SPORTS MEDICINE | Facility: CLINIC | Age: 26
End: 2021-08-27

## 2021-09-02 ENCOUNTER — TELEPHONE (OUTPATIENT)
Dept: SPORTS MEDICINE | Facility: CLINIC | Age: 26
End: 2021-09-02

## 2021-09-07 ENCOUNTER — TELEPHONE (OUTPATIENT)
Dept: SPORTS MEDICINE | Facility: CLINIC | Age: 26
End: 2021-09-07

## 2021-09-07 DIAGNOSIS — M25.561 ACUTE PAIN OF RIGHT KNEE: Primary | ICD-10-CM

## 2021-09-08 ENCOUNTER — HOSPITAL ENCOUNTER (OUTPATIENT)
Dept: RADIOLOGY | Facility: HOSPITAL | Age: 26
Discharge: HOME | End: 2021-09-08
Attending: STUDENT IN AN ORGANIZED HEALTH CARE EDUCATION/TRAINING PROGRAM
Payer: MEDICAID

## 2021-09-08 ENCOUNTER — OFFICE VISIT (OUTPATIENT)
Dept: SPORTS MEDICINE | Facility: CLINIC | Age: 26
End: 2021-09-08
Payer: MEDICAID

## 2021-09-08 VITALS
HEIGHT: 66 IN | WEIGHT: 240 LBS | BODY MASS INDEX: 38.57 KG/M2 | SYSTOLIC BLOOD PRESSURE: 128 MMHG | DIASTOLIC BLOOD PRESSURE: 85 MMHG | HEART RATE: 70 BPM

## 2021-09-08 DIAGNOSIS — M25.561 ACUTE PAIN OF RIGHT KNEE: ICD-10-CM

## 2021-09-08 DIAGNOSIS — M25.461 EFFUSION OF RIGHT KNEE JOINT: ICD-10-CM

## 2021-09-08 DIAGNOSIS — M25.361 KNEE INSTABILITY, RIGHT: Primary | ICD-10-CM

## 2021-09-08 PROCEDURE — 73564 X-RAY EXAM KNEE 4 OR MORE: CPT | Mod: TC,50,PN

## 2021-09-08 PROCEDURE — 73564 X-RAY EXAM KNEE 4 OR MORE: CPT | Mod: 26,50,, | Performed by: RADIOLOGY

## 2021-09-08 PROCEDURE — 99213 OFFICE O/P EST LOW 20 MIN: CPT | Mod: PBBFAC,PN | Performed by: STUDENT IN AN ORGANIZED HEALTH CARE EDUCATION/TRAINING PROGRAM

## 2021-09-08 PROCEDURE — 99204 OFFICE O/P NEW MOD 45 MIN: CPT | Mod: S$PBB,,, | Performed by: STUDENT IN AN ORGANIZED HEALTH CARE EDUCATION/TRAINING PROGRAM

## 2021-09-08 PROCEDURE — 99999 PR PBB SHADOW E&M-EST. PATIENT-LVL III: ICD-10-PCS | Mod: PBBFAC,,, | Performed by: STUDENT IN AN ORGANIZED HEALTH CARE EDUCATION/TRAINING PROGRAM

## 2021-09-08 PROCEDURE — 99204 PR OFFICE/OUTPT VISIT, NEW, LEVL IV, 45-59 MIN: ICD-10-PCS | Mod: S$PBB,,, | Performed by: STUDENT IN AN ORGANIZED HEALTH CARE EDUCATION/TRAINING PROGRAM

## 2021-09-08 PROCEDURE — 73564 XR KNEE ORTHO BILAT WITH FLEXION: ICD-10-PCS | Mod: 26,50,, | Performed by: RADIOLOGY

## 2021-09-08 PROCEDURE — 99999 PR PBB SHADOW E&M-EST. PATIENT-LVL III: CPT | Mod: PBBFAC,,, | Performed by: STUDENT IN AN ORGANIZED HEALTH CARE EDUCATION/TRAINING PROGRAM

## 2021-09-09 ENCOUNTER — TELEPHONE (OUTPATIENT)
Dept: SPORTS MEDICINE | Facility: CLINIC | Age: 26
End: 2021-09-09

## 2021-09-09 ENCOUNTER — HOSPITAL ENCOUNTER (OUTPATIENT)
Dept: RADIOLOGY | Facility: HOSPITAL | Age: 26
Discharge: HOME | End: 2021-09-09
Attending: STUDENT IN AN ORGANIZED HEALTH CARE EDUCATION/TRAINING PROGRAM
Payer: MEDICAID

## 2021-09-09 DIAGNOSIS — M25.361 KNEE INSTABILITY, RIGHT: ICD-10-CM

## 2021-09-09 PROCEDURE — 73721 MRI JNT OF LWR EXTRE W/O DYE: CPT | Mod: 26,RT,, | Performed by: RADIOLOGY

## 2021-09-09 PROCEDURE — 73721 MRI KNEE WITHOUT CONTRAST RIGHT: ICD-10-PCS | Mod: 26,RT,, | Performed by: RADIOLOGY

## 2021-09-09 PROCEDURE — 73721 MRI JNT OF LWR EXTRE W/O DYE: CPT | Mod: TC,RT

## 2021-09-13 ENCOUNTER — TELEPHONE (OUTPATIENT)
Dept: SPORTS MEDICINE | Facility: CLINIC | Age: 26
End: 2021-09-13

## 2021-09-13 ENCOUNTER — ANESTHESIA EVENT (OUTPATIENT)
Dept: SURGERY | Facility: HOSPITAL | Age: 26
End: 2021-09-13
Payer: MEDICAID

## 2021-09-13 ENCOUNTER — OFFICE VISIT (OUTPATIENT)
Dept: SPORTS MEDICINE | Facility: CLINIC | Age: 26
End: 2021-09-13
Payer: MEDICAID

## 2021-09-13 VITALS
DIASTOLIC BLOOD PRESSURE: 80 MMHG | HEIGHT: 66 IN | BODY MASS INDEX: 38.87 KG/M2 | HEART RATE: 71 BPM | WEIGHT: 241.88 LBS | SYSTOLIC BLOOD PRESSURE: 120 MMHG

## 2021-09-13 DIAGNOSIS — M25.461 EFFUSION OF RIGHT KNEE JOINT: ICD-10-CM

## 2021-09-13 DIAGNOSIS — T84.89XA ACL GRAFT TEAR, INITIAL ENCOUNTER: ICD-10-CM

## 2021-09-13 DIAGNOSIS — T84.89XA ACL GRAFT TEAR, INITIAL ENCOUNTER: Primary | ICD-10-CM

## 2021-09-13 DIAGNOSIS — M25.361 KNEE INSTABILITY, RIGHT: ICD-10-CM

## 2021-09-13 DIAGNOSIS — Z01.818 PREOP TESTING: Primary | ICD-10-CM

## 2021-09-13 DIAGNOSIS — Z01.818 PRE-OP TESTING: Primary | ICD-10-CM

## 2021-09-13 PROCEDURE — 99999 PR PBB SHADOW E&M-EST. PATIENT-LVL II: CPT | Mod: PBBFAC,,, | Performed by: STUDENT IN AN ORGANIZED HEALTH CARE EDUCATION/TRAINING PROGRAM

## 2021-09-13 PROCEDURE — 99214 OFFICE O/P EST MOD 30 MIN: CPT | Mod: S$PBB,,, | Performed by: STUDENT IN AN ORGANIZED HEALTH CARE EDUCATION/TRAINING PROGRAM

## 2021-09-13 PROCEDURE — 99212 OFFICE O/P EST SF 10 MIN: CPT | Mod: PBBFAC,PN | Performed by: STUDENT IN AN ORGANIZED HEALTH CARE EDUCATION/TRAINING PROGRAM

## 2021-09-13 PROCEDURE — 99214 PR OFFICE/OUTPT VISIT, EST, LEVL IV, 30-39 MIN: ICD-10-PCS | Mod: S$PBB,,, | Performed by: STUDENT IN AN ORGANIZED HEALTH CARE EDUCATION/TRAINING PROGRAM

## 2021-09-13 PROCEDURE — 99999 PR PBB SHADOW E&M-EST. PATIENT-LVL II: ICD-10-PCS | Mod: PBBFAC,,, | Performed by: STUDENT IN AN ORGANIZED HEALTH CARE EDUCATION/TRAINING PROGRAM

## 2021-09-14 ENCOUNTER — LAB VISIT (OUTPATIENT)
Dept: SURGERY | Facility: CLINIC | Age: 26
End: 2021-09-14
Payer: MEDICAID

## 2021-09-14 DIAGNOSIS — Z01.818 PRE-OP TESTING: ICD-10-CM

## 2021-09-14 LAB — SARS-COV-2 RNA RESP QL NAA+PROBE: NOT DETECTED

## 2021-09-14 PROCEDURE — U0005 INFEC AGEN DETEC AMPLI PROBE: HCPCS | Performed by: STUDENT IN AN ORGANIZED HEALTH CARE EDUCATION/TRAINING PROGRAM

## 2021-09-14 PROCEDURE — U0003 INFECTIOUS AGENT DETECTION BY NUCLEIC ACID (DNA OR RNA); SEVERE ACUTE RESPIRATORY SYNDROME CORONAVIRUS 2 (SARS-COV-2) (CORONAVIRUS DISEASE [COVID-19]), AMPLIFIED PROBE TECHNIQUE, MAKING USE OF HIGH THROUGHPUT TECHNOLOGIES AS DESCRIBED BY CMS-2020-01-R: HCPCS | Performed by: STUDENT IN AN ORGANIZED HEALTH CARE EDUCATION/TRAINING PROGRAM

## 2021-09-16 ENCOUNTER — OFFICE VISIT (OUTPATIENT)
Dept: SPORTS MEDICINE | Facility: CLINIC | Age: 26
End: 2021-09-16
Payer: MEDICAID

## 2021-09-16 VITALS — HEIGHT: 66 IN | BODY MASS INDEX: 38.51 KG/M2 | WEIGHT: 239.63 LBS

## 2021-09-16 DIAGNOSIS — T84.89XA ACL GRAFT TEAR, INITIAL ENCOUNTER: Primary | ICD-10-CM

## 2021-09-16 PROCEDURE — 99999 PR PBB SHADOW E&M-EST. PATIENT-LVL III: ICD-10-PCS | Mod: PBBFAC,,, | Performed by: PHYSICIAN ASSISTANT

## 2021-09-16 PROCEDURE — 99213 OFFICE O/P EST LOW 20 MIN: CPT | Mod: PBBFAC,PN | Performed by: PHYSICIAN ASSISTANT

## 2021-09-16 PROCEDURE — 99999 PR PBB SHADOW E&M-EST. PATIENT-LVL III: CPT | Mod: PBBFAC,,, | Performed by: PHYSICIAN ASSISTANT

## 2021-09-16 RX ORDER — ASPIRIN 81 MG/1
81 TABLET ORAL DAILY
Qty: 14 TABLET | Refills: 0 | Status: SHIPPED | OUTPATIENT
Start: 2021-09-16 | End: 2022-01-10

## 2021-09-16 RX ORDER — MUPIROCIN 20 MG/G
OINTMENT TOPICAL
Status: CANCELLED | OUTPATIENT
Start: 2021-09-16

## 2021-09-16 RX ORDER — ONDANSETRON 4 MG/1
4 TABLET, ORALLY DISINTEGRATING ORAL EVERY 8 HOURS PRN
Qty: 20 TABLET | Refills: 0 | Status: SHIPPED | OUTPATIENT
Start: 2021-09-16 | End: 2023-07-03

## 2021-09-16 RX ORDER — OXYCODONE HYDROCHLORIDE 5 MG/1
TABLET ORAL
Qty: 28 TABLET | Refills: 0 | Status: SHIPPED | OUTPATIENT
Start: 2021-09-16 | End: 2022-01-10

## 2021-09-16 RX ORDER — SODIUM CHLORIDE 9 MG/ML
INJECTION, SOLUTION INTRAVENOUS CONTINUOUS
Status: CANCELLED | OUTPATIENT
Start: 2021-09-16

## 2021-09-16 RX ORDER — CELECOXIB 200 MG/1
200 CAPSULE ORAL 2 TIMES DAILY WITH MEALS
Qty: 42 CAPSULE | Refills: 0 | Status: SHIPPED | OUTPATIENT
Start: 2021-09-16 | End: 2022-01-10

## 2021-09-16 RX ORDER — CEFAZOLIN SODIUM 2 G/50ML
2 SOLUTION INTRAVENOUS
Status: CANCELLED | OUTPATIENT
Start: 2021-09-16

## 2021-09-17 ENCOUNTER — HOSPITAL ENCOUNTER (OUTPATIENT)
Facility: HOSPITAL | Age: 26
Discharge: HOME | End: 2021-09-17
Attending: STUDENT IN AN ORGANIZED HEALTH CARE EDUCATION/TRAINING PROGRAM | Admitting: STUDENT IN AN ORGANIZED HEALTH CARE EDUCATION/TRAINING PROGRAM
Payer: MEDICAID

## 2021-09-17 ENCOUNTER — TELEPHONE (OUTPATIENT)
Dept: PHARMACY | Facility: CLINIC | Age: 26
End: 2021-09-17

## 2021-09-17 ENCOUNTER — ANESTHESIA (OUTPATIENT)
Dept: SURGERY | Facility: HOSPITAL | Age: 26
End: 2021-09-17
Payer: MEDICAID

## 2021-09-17 DIAGNOSIS — T84.89XA ACL GRAFT TEAR, INITIAL ENCOUNTER: ICD-10-CM

## 2021-09-17 PROCEDURE — 29877 ARTHRS KNEE SURG DBRDMT/SHVG: CPT | Mod: RT,,, | Performed by: STUDENT IN AN ORGANIZED HEALTH CARE EDUCATION/TRAINING PROGRAM

## 2021-09-17 PROCEDURE — 29877 PR KNEE SCOPE,SHAVE ARTICULAR CART: ICD-10-PCS | Mod: RT,,, | Performed by: STUDENT IN AN ORGANIZED HEALTH CARE EDUCATION/TRAINING PROGRAM

## 2021-09-17 PROCEDURE — 63600175 PHARM REV CODE 636 W HCPCS: Performed by: ANESTHESIOLOGY

## 2021-09-17 PROCEDURE — 36000710: Performed by: STUDENT IN AN ORGANIZED HEALTH CARE EDUCATION/TRAINING PROGRAM

## 2021-09-17 PROCEDURE — 20680 REMOVAL OF IMPLANT DEEP: CPT | Mod: 51,RT,, | Performed by: STUDENT IN AN ORGANIZED HEALTH CARE EDUCATION/TRAINING PROGRAM

## 2021-09-17 PROCEDURE — D9220A PRA ANESTHESIA: Mod: CRNA,,, | Performed by: NURSE ANESTHETIST, CERTIFIED REGISTERED

## 2021-09-17 PROCEDURE — 25000003 PHARM REV CODE 250: Performed by: ANESTHESIOLOGY

## 2021-09-17 PROCEDURE — 76942 ECHO GUIDE FOR BIOPSY: CPT | Mod: 26,,, | Performed by: ANESTHESIOLOGY

## 2021-09-17 PROCEDURE — 27201423 OPTIME MED/SURG SUP & DEVICES STERILE SUPPLY: Performed by: STUDENT IN AN ORGANIZED HEALTH CARE EDUCATION/TRAINING PROGRAM

## 2021-09-17 PROCEDURE — 25000003 PHARM REV CODE 250: Performed by: ORTHOPAEDIC SURGERY

## 2021-09-17 PROCEDURE — 94761 N-INVAS EAR/PLS OXIMETRY MLT: CPT

## 2021-09-17 PROCEDURE — 71000039 HC RECOVERY, EACH ADD'L HOUR: Performed by: STUDENT IN AN ORGANIZED HEALTH CARE EDUCATION/TRAINING PROGRAM

## 2021-09-17 PROCEDURE — 71000033 HC RECOVERY, INTIAL HOUR: Performed by: STUDENT IN AN ORGANIZED HEALTH CARE EDUCATION/TRAINING PROGRAM

## 2021-09-17 PROCEDURE — 63600175 PHARM REV CODE 636 W HCPCS: Performed by: ORTHOPAEDIC SURGERY

## 2021-09-17 PROCEDURE — 27200750 HC INSULATED NEEDLE/ STIMUPLEX: Performed by: ANESTHESIOLOGY

## 2021-09-17 PROCEDURE — D9220A PRA ANESTHESIA: ICD-10-PCS | Mod: CRNA,,, | Performed by: NURSE ANESTHETIST, CERTIFIED REGISTERED

## 2021-09-17 PROCEDURE — 36000711: Performed by: STUDENT IN AN ORGANIZED HEALTH CARE EDUCATION/TRAINING PROGRAM

## 2021-09-17 PROCEDURE — 63600175 PHARM REV CODE 636 W HCPCS: Performed by: NURSE ANESTHETIST, CERTIFIED REGISTERED

## 2021-09-17 PROCEDURE — 01400 ANES OPN/ARTHRS KNEE JT NOS: CPT | Performed by: STUDENT IN AN ORGANIZED HEALTH CARE EDUCATION/TRAINING PROGRAM

## 2021-09-17 PROCEDURE — D9220A PRA ANESTHESIA: ICD-10-PCS | Mod: ANES,,, | Performed by: ANESTHESIOLOGY

## 2021-09-17 PROCEDURE — 27800903 OPTIME MED/SURG SUP & DEVICES OTHER IMPLANTS: Performed by: STUDENT IN AN ORGANIZED HEALTH CARE EDUCATION/TRAINING PROGRAM

## 2021-09-17 PROCEDURE — 63600175 PHARM REV CODE 636 W HCPCS: Performed by: STUDENT IN AN ORGANIZED HEALTH CARE EDUCATION/TRAINING PROGRAM

## 2021-09-17 PROCEDURE — D9220A PRA ANESTHESIA: Mod: ANES,,, | Performed by: ANESTHESIOLOGY

## 2021-09-17 PROCEDURE — 27200651 HC AIRWAY, LMA: Performed by: ANESTHESIOLOGY

## 2021-09-17 PROCEDURE — 25000003 PHARM REV CODE 250: Performed by: NURSE ANESTHETIST, CERTIFIED REGISTERED

## 2021-09-17 PROCEDURE — 37000008 HC ANESTHESIA 1ST 15 MINUTES: Performed by: STUDENT IN AN ORGANIZED HEALTH CARE EDUCATION/TRAINING PROGRAM

## 2021-09-17 PROCEDURE — 76942 ADDUCTOR CANAL SINGLE INJECTION BLOCK: ICD-10-PCS | Mod: 26,,, | Performed by: ANESTHESIOLOGY

## 2021-09-17 PROCEDURE — 71000015 HC POSTOP RECOV 1ST HR: Performed by: STUDENT IN AN ORGANIZED HEALTH CARE EDUCATION/TRAINING PROGRAM

## 2021-09-17 PROCEDURE — 20680 PR REMOVAL DEEP IMPLANT: ICD-10-PCS | Mod: 51,RT,, | Performed by: STUDENT IN AN ORGANIZED HEALTH CARE EDUCATION/TRAINING PROGRAM

## 2021-09-17 PROCEDURE — 64447 NJX AA&/STRD FEMORAL NRV IMG: CPT | Performed by: SURGERY

## 2021-09-17 PROCEDURE — 64447 ADDUCTOR CANAL SINGLE INJECTION BLOCK: ICD-10-PCS | Mod: 59,RT,, | Performed by: ANESTHESIOLOGY

## 2021-09-17 PROCEDURE — 64447 NJX AA&/STRD FEMORAL NRV IMG: CPT | Mod: 59,RT,, | Performed by: ANESTHESIOLOGY

## 2021-09-17 PROCEDURE — 99900035 HC TECH TIME PER 15 MIN (STAT)

## 2021-09-17 PROCEDURE — 25000003 PHARM REV CODE 250: Performed by: SURGERY

## 2021-09-17 PROCEDURE — 25000003 PHARM REV CODE 250: Performed by: STUDENT IN AN ORGANIZED HEALTH CARE EDUCATION/TRAINING PROGRAM

## 2021-09-17 PROCEDURE — 37000009 HC ANESTHESIA EA ADD 15 MINS: Performed by: STUDENT IN AN ORGANIZED HEALTH CARE EDUCATION/TRAINING PROGRAM

## 2021-09-17 PROCEDURE — 76942 ECHO GUIDE FOR BIOPSY: CPT | Performed by: SURGERY

## 2021-09-17 DEVICE — IMPLANTABLE DEVICE: Type: IMPLANTABLE DEVICE | Site: KNEE | Status: FUNCTIONAL

## 2021-09-17 DEVICE — GRAFT PRIME DBM HD BONE 2.5CC: Type: IMPLANTABLE DEVICE | Site: KNEE | Status: FUNCTIONAL

## 2021-09-17 RX ORDER — METHOCARBAMOL 500 MG/1
1000 TABLET, FILM COATED ORAL ONCE
Status: COMPLETED | OUTPATIENT
Start: 2021-09-17 | End: 2021-09-17

## 2021-09-17 RX ORDER — LIDOCAINE HYDROCHLORIDE 20 MG/ML
INJECTION INTRAVENOUS
Status: DISCONTINUED | OUTPATIENT
Start: 2021-09-17 | End: 2021-09-17

## 2021-09-17 RX ORDER — DEXAMETHASONE SODIUM PHOSPHATE 4 MG/ML
INJECTION, SOLUTION INTRA-ARTICULAR; INTRALESIONAL; INTRAMUSCULAR; INTRAVENOUS; SOFT TISSUE
Status: DISCONTINUED | OUTPATIENT
Start: 2021-09-17 | End: 2021-09-17

## 2021-09-17 RX ORDER — DEXMEDETOMIDINE HYDROCHLORIDE 100 UG/ML
INJECTION, SOLUTION INTRAVENOUS
Status: DISCONTINUED | OUTPATIENT
Start: 2021-09-17 | End: 2021-09-17

## 2021-09-17 RX ORDER — ACETAMINOPHEN 500 MG
1000 TABLET ORAL ONCE
Status: COMPLETED | OUTPATIENT
Start: 2021-09-17 | End: 2021-09-17

## 2021-09-17 RX ORDER — CELECOXIB 200 MG/1
400 CAPSULE ORAL ONCE
Status: COMPLETED | OUTPATIENT
Start: 2021-09-17 | End: 2021-09-17

## 2021-09-17 RX ORDER — CEFAZOLIN SODIUM 1 G/3ML
2 INJECTION, POWDER, FOR SOLUTION INTRAMUSCULAR; INTRAVENOUS
Status: DISCONTINUED | OUTPATIENT
Start: 2021-09-17 | End: 2021-09-17 | Stop reason: HOSPADM

## 2021-09-17 RX ORDER — SODIUM CHLORIDE 9 MG/ML
INJECTION, SOLUTION INTRAVENOUS CONTINUOUS
Status: DISCONTINUED | OUTPATIENT
Start: 2021-09-17 | End: 2021-09-17 | Stop reason: HOSPADM

## 2021-09-17 RX ORDER — HYDROMORPHONE HYDROCHLORIDE 1 MG/ML
0.2 INJECTION, SOLUTION INTRAMUSCULAR; INTRAVENOUS; SUBCUTANEOUS EVERY 5 MIN PRN
Status: DISCONTINUED | OUTPATIENT
Start: 2021-09-17 | End: 2021-09-17 | Stop reason: HOSPADM

## 2021-09-17 RX ORDER — CARBOXYMETHYLCELLULOSE SODIUM 10 MG/ML
GEL OPHTHALMIC
Status: DISCONTINUED | OUTPATIENT
Start: 2021-09-17 | End: 2021-09-17

## 2021-09-17 RX ORDER — EPINEPHRINE 1 MG/ML
INJECTION, SOLUTION INTRACARDIAC; INTRAMUSCULAR; INTRAVENOUS; SUBCUTANEOUS
Status: DISCONTINUED | OUTPATIENT
Start: 2021-09-17 | End: 2021-09-17 | Stop reason: HOSPADM

## 2021-09-17 RX ORDER — FAMOTIDINE 10 MG/ML
INJECTION INTRAVENOUS
Status: DISCONTINUED | OUTPATIENT
Start: 2021-09-17 | End: 2021-09-17

## 2021-09-17 RX ORDER — SODIUM CHLORIDE 0.9 % (FLUSH) 0.9 %
3 SYRINGE (ML) INJECTION
Status: DISCONTINUED | OUTPATIENT
Start: 2021-09-17 | End: 2021-09-17 | Stop reason: HOSPADM

## 2021-09-17 RX ORDER — BUPIVACAINE HYDROCHLORIDE 2.5 MG/ML
INJECTION, SOLUTION EPIDURAL; INFILTRATION; INTRACAUDAL
Status: DISCONTINUED | OUTPATIENT
Start: 2021-09-17 | End: 2021-09-17 | Stop reason: HOSPADM

## 2021-09-17 RX ORDER — MIDAZOLAM HYDROCHLORIDE 1 MG/ML
INJECTION INTRAMUSCULAR; INTRAVENOUS
Status: DISCONTINUED | OUTPATIENT
Start: 2021-09-17 | End: 2021-09-17

## 2021-09-17 RX ORDER — KETAMINE HCL IN 0.9 % NACL 50 MG/5 ML
SYRINGE (ML) INTRAVENOUS
Status: DISCONTINUED | OUTPATIENT
Start: 2021-09-17 | End: 2021-09-17

## 2021-09-17 RX ORDER — BUPIVACAINE HYDROCHLORIDE 2.5 MG/ML
INJECTION, SOLUTION EPIDURAL; INFILTRATION; INTRACAUDAL
Status: DISCONTINUED | OUTPATIENT
Start: 2021-09-17 | End: 2021-09-17

## 2021-09-17 RX ORDER — PROPOFOL 10 MG/ML
VIAL (ML) INTRAVENOUS
Status: DISCONTINUED | OUTPATIENT
Start: 2021-09-17 | End: 2021-09-17

## 2021-09-17 RX ORDER — ONDANSETRON 2 MG/ML
INJECTION INTRAMUSCULAR; INTRAVENOUS
Status: DISCONTINUED | OUTPATIENT
Start: 2021-09-17 | End: 2021-09-17

## 2021-09-17 RX ORDER — MUPIROCIN 20 MG/G
OINTMENT TOPICAL
Status: DISCONTINUED | OUTPATIENT
Start: 2021-09-17 | End: 2021-09-17 | Stop reason: HOSPADM

## 2021-09-17 RX ORDER — FENTANYL CITRATE 50 UG/ML
INJECTION, SOLUTION INTRAMUSCULAR; INTRAVENOUS
Status: DISCONTINUED | OUTPATIENT
Start: 2021-09-17 | End: 2021-09-17

## 2021-09-17 RX ADMIN — HYDROMORPHONE HYDROCHLORIDE 0.2 MG: 1 INJECTION, SOLUTION INTRAMUSCULAR; INTRAVENOUS; SUBCUTANEOUS at 11:09

## 2021-09-17 RX ADMIN — BUPIVACAINE HYDROCHLORIDE 20 ML: 2.5 INJECTION, SOLUTION EPIDURAL; INFILTRATION; INTRACAUDAL; PERINEURAL at 12:09

## 2021-09-17 RX ADMIN — FENTANYL CITRATE 50 MCG: 50 INJECTION, SOLUTION INTRAMUSCULAR; INTRAVENOUS at 08:09

## 2021-09-17 RX ADMIN — CARBOXYMETHYLCELLULOSE SODIUM 4 DROP: 10 GEL OPHTHALMIC at 07:09

## 2021-09-17 RX ADMIN — FAMOTIDINE 20 MG: 10 INJECTION, SOLUTION INTRAVENOUS at 07:09

## 2021-09-17 RX ADMIN — METHOCARBAMOL 1000 MG: 500 TABLET ORAL at 11:09

## 2021-09-17 RX ADMIN — HYDROMORPHONE HYDROCHLORIDE 0.2 MG: 1 INJECTION, SOLUTION INTRAMUSCULAR; INTRAVENOUS; SUBCUTANEOUS at 10:09

## 2021-09-17 RX ADMIN — ACETAMINOPHEN 1000 MG: 500 TABLET ORAL at 06:09

## 2021-09-17 RX ADMIN — PROPOFOL 200 MG: 10 INJECTION, EMULSION INTRAVENOUS at 07:09

## 2021-09-17 RX ADMIN — DEXMEDETOMIDINE HYDROCHLORIDE 10 MCG: 100 INJECTION, SOLUTION, CONCENTRATE INTRAVENOUS at 07:09

## 2021-09-17 RX ADMIN — DEXAMETHASONE SODIUM PHOSPHATE 8 MG: 4 INJECTION, SOLUTION INTRAMUSCULAR; INTRAVENOUS at 07:09

## 2021-09-17 RX ADMIN — DEXMEDETOMIDINE HYDROCHLORIDE 10 MCG: 100 INJECTION, SOLUTION, CONCENTRATE INTRAVENOUS at 08:09

## 2021-09-17 RX ADMIN — MUPIROCIN: 20 OINTMENT TOPICAL at 06:09

## 2021-09-17 RX ADMIN — Medication 10 MG: at 09:09

## 2021-09-17 RX ADMIN — FENTANYL CITRATE 50 MCG: 50 INJECTION, SOLUTION INTRAMUSCULAR; INTRAVENOUS at 07:09

## 2021-09-17 RX ADMIN — ONDANSETRON 4 MG: 2 INJECTION, SOLUTION INTRAMUSCULAR; INTRAVENOUS at 09:09

## 2021-09-17 RX ADMIN — Medication 30 MG: at 07:09

## 2021-09-17 RX ADMIN — PROPOFOL 100 MG: 10 INJECTION, EMULSION INTRAVENOUS at 07:09

## 2021-09-17 RX ADMIN — LIDOCAINE HYDROCHLORIDE 80 MG: 20 INJECTION, SOLUTION INTRAVENOUS at 07:09

## 2021-09-17 RX ADMIN — SODIUM CHLORIDE: 0.9 INJECTION, SOLUTION INTRAVENOUS at 06:09

## 2021-09-17 RX ADMIN — MIDAZOLAM HYDROCHLORIDE 2 MG: 1 INJECTION, SOLUTION INTRAMUSCULAR; INTRAVENOUS at 06:09

## 2021-09-17 RX ADMIN — FENTANYL CITRATE 100 MCG: 50 INJECTION, SOLUTION INTRAMUSCULAR; INTRAVENOUS at 09:09

## 2021-09-17 RX ADMIN — Medication 10 MG: at 08:09

## 2021-09-17 RX ADMIN — CEFAZOLIN 2 G: 330 INJECTION, POWDER, FOR SOLUTION INTRAMUSCULAR; INTRAVENOUS at 07:09

## 2021-09-17 RX ADMIN — CELECOXIB 400 MG: 200 CAPSULE ORAL at 06:09

## 2021-09-21 ENCOUNTER — TELEPHONE (OUTPATIENT)
Dept: SPORTS MEDICINE | Facility: CLINIC | Age: 26
End: 2021-09-21

## 2021-09-22 VITALS
TEMPERATURE: 98 F | SYSTOLIC BLOOD PRESSURE: 157 MMHG | WEIGHT: 239.63 LBS | OXYGEN SATURATION: 98 % | RESPIRATION RATE: 14 BRPM | HEART RATE: 60 BPM | HEIGHT: 66 IN | BODY MASS INDEX: 38.51 KG/M2 | DIASTOLIC BLOOD PRESSURE: 96 MMHG

## 2021-09-30 ENCOUNTER — OFFICE VISIT (OUTPATIENT)
Dept: SPORTS MEDICINE | Facility: CLINIC | Age: 26
End: 2021-09-30
Payer: MEDICAID

## 2021-09-30 ENCOUNTER — TELEPHONE (OUTPATIENT)
Dept: SPORTS MEDICINE | Facility: CLINIC | Age: 26
End: 2021-09-30

## 2021-09-30 VITALS
BODY MASS INDEX: 38.25 KG/M2 | HEART RATE: 69 BPM | WEIGHT: 238 LBS | HEIGHT: 66 IN | DIASTOLIC BLOOD PRESSURE: 93 MMHG | SYSTOLIC BLOOD PRESSURE: 135 MMHG

## 2021-09-30 DIAGNOSIS — Z98.890 S/P RIGHT KNEE ARTHROSCOPY: ICD-10-CM

## 2021-09-30 DIAGNOSIS — M25.361 KNEE INSTABILITY, RIGHT: Primary | ICD-10-CM

## 2021-09-30 PROCEDURE — 99999 PR PBB SHADOW E&M-EST. PATIENT-LVL III: ICD-10-PCS | Mod: PBBFAC,,, | Performed by: STUDENT IN AN ORGANIZED HEALTH CARE EDUCATION/TRAINING PROGRAM

## 2021-09-30 PROCEDURE — 99024 PR POST-OP FOLLOW-UP VISIT: ICD-10-PCS | Mod: ,,, | Performed by: STUDENT IN AN ORGANIZED HEALTH CARE EDUCATION/TRAINING PROGRAM

## 2021-09-30 PROCEDURE — 99213 OFFICE O/P EST LOW 20 MIN: CPT | Mod: PBBFAC | Performed by: STUDENT IN AN ORGANIZED HEALTH CARE EDUCATION/TRAINING PROGRAM

## 2021-09-30 PROCEDURE — 99999 PR PBB SHADOW E&M-EST. PATIENT-LVL III: CPT | Mod: PBBFAC,,, | Performed by: STUDENT IN AN ORGANIZED HEALTH CARE EDUCATION/TRAINING PROGRAM

## 2021-09-30 PROCEDURE — 99024 POSTOP FOLLOW-UP VISIT: CPT | Mod: ,,, | Performed by: STUDENT IN AN ORGANIZED HEALTH CARE EDUCATION/TRAINING PROGRAM

## 2021-09-30 RX ORDER — TRAMADOL HYDROCHLORIDE 50 MG/1
50 TABLET ORAL EVERY 6 HOURS
Qty: 31 TABLET | Refills: 0 | Status: SHIPPED | OUTPATIENT
Start: 2021-09-30 | End: 2023-07-03

## 2021-10-28 ENCOUNTER — OFFICE VISIT (OUTPATIENT)
Dept: SPORTS MEDICINE | Facility: CLINIC | Age: 26
End: 2021-10-28
Payer: MEDICAID

## 2021-10-28 ENCOUNTER — HOSPITAL ENCOUNTER (OUTPATIENT)
Dept: RADIOLOGY | Facility: HOSPITAL | Age: 26
Discharge: HOME | End: 2021-10-28
Attending: STUDENT IN AN ORGANIZED HEALTH CARE EDUCATION/TRAINING PROGRAM
Payer: MEDICAID

## 2021-10-28 VITALS
WEIGHT: 238 LBS | HEART RATE: 67 BPM | SYSTOLIC BLOOD PRESSURE: 120 MMHG | BODY MASS INDEX: 38.25 KG/M2 | HEIGHT: 66 IN | DIASTOLIC BLOOD PRESSURE: 80 MMHG

## 2021-10-28 DIAGNOSIS — M25.561 RIGHT KNEE PAIN, UNSPECIFIED CHRONICITY: Primary | ICD-10-CM

## 2021-10-28 DIAGNOSIS — M25.361 KNEE INSTABILITY, RIGHT: ICD-10-CM

## 2021-10-28 DIAGNOSIS — M25.561 RIGHT KNEE PAIN, UNSPECIFIED CHRONICITY: ICD-10-CM

## 2021-10-28 DIAGNOSIS — Z98.890 S/P RIGHT KNEE ARTHROSCOPY: ICD-10-CM

## 2021-10-28 PROCEDURE — 99024 POSTOP FOLLOW-UP VISIT: CPT | Mod: S$PBB,,, | Performed by: STUDENT IN AN ORGANIZED HEALTH CARE EDUCATION/TRAINING PROGRAM

## 2021-10-28 PROCEDURE — 73560 X-RAY EXAM OF KNEE 1 OR 2: CPT | Mod: 26,RT,, | Performed by: RADIOLOGY

## 2021-10-28 PROCEDURE — 99024 PR POST-OP FOLLOW-UP VISIT: ICD-10-PCS | Mod: S$PBB,,, | Performed by: STUDENT IN AN ORGANIZED HEALTH CARE EDUCATION/TRAINING PROGRAM

## 2021-10-28 PROCEDURE — 73560 XR KNEE 1 OR 2 VIEW RIGHT: ICD-10-PCS | Mod: 26,RT,, | Performed by: RADIOLOGY

## 2021-10-28 PROCEDURE — 73560 X-RAY EXAM OF KNEE 1 OR 2: CPT | Mod: TC,RT

## 2021-10-28 PROCEDURE — 99213 OFFICE O/P EST LOW 20 MIN: CPT | Mod: PBBFAC | Performed by: STUDENT IN AN ORGANIZED HEALTH CARE EDUCATION/TRAINING PROGRAM

## 2021-10-28 PROCEDURE — 99999 PR PBB SHADOW E&M-EST. PATIENT-LVL III: ICD-10-PCS | Mod: PBBFAC,,, | Performed by: STUDENT IN AN ORGANIZED HEALTH CARE EDUCATION/TRAINING PROGRAM

## 2021-10-28 PROCEDURE — 99999 PR PBB SHADOW E&M-EST. PATIENT-LVL III: CPT | Mod: PBBFAC,,, | Performed by: STUDENT IN AN ORGANIZED HEALTH CARE EDUCATION/TRAINING PROGRAM

## 2021-11-08 ENCOUNTER — OFFICE VISIT (OUTPATIENT)
Dept: UROLOGY | Facility: CLINIC | Age: 26
End: 2021-11-08
Payer: MEDICAID

## 2021-11-08 VITALS — WEIGHT: 240.31 LBS | HEIGHT: 67 IN | BODY MASS INDEX: 37.72 KG/M2

## 2021-11-08 DIAGNOSIS — N52.9 ERECTILE DYSFUNCTION, UNSPECIFIED ERECTILE DYSFUNCTION TYPE: Primary | ICD-10-CM

## 2021-11-08 PROCEDURE — 99999 PR PBB SHADOW E&M-EST. PATIENT-LVL III: CPT | Mod: PBBFAC,,, | Performed by: UROLOGY

## 2021-11-08 PROCEDURE — 99213 PR OFFICE/OUTPT VISIT, EST, LEVL III, 20-29 MIN: ICD-10-PCS | Mod: S$PBB,,, | Performed by: UROLOGY

## 2021-11-08 PROCEDURE — 99213 OFFICE O/P EST LOW 20 MIN: CPT | Mod: S$PBB,,, | Performed by: UROLOGY

## 2021-11-08 PROCEDURE — 99999 PR PBB SHADOW E&M-EST. PATIENT-LVL III: ICD-10-PCS | Mod: PBBFAC,,, | Performed by: UROLOGY

## 2021-11-08 PROCEDURE — 99213 OFFICE O/P EST LOW 20 MIN: CPT | Mod: PBBFAC | Performed by: UROLOGY

## 2021-11-08 RX ORDER — TADALAFIL 5 MG/1
5 TABLET ORAL DAILY PRN
Qty: 30 TABLET | Refills: 11 | Status: SHIPPED | OUTPATIENT
Start: 2021-11-08 | End: 2023-07-03

## 2021-12-09 ENCOUNTER — TELEPHONE (OUTPATIENT)
Dept: SPORTS MEDICINE | Facility: CLINIC | Age: 26
End: 2021-12-09
Payer: MEDICAID

## 2021-12-09 ENCOUNTER — OFFICE VISIT (OUTPATIENT)
Dept: SPORTS MEDICINE | Facility: CLINIC | Age: 26
End: 2021-12-09
Payer: MEDICAID

## 2021-12-09 ENCOUNTER — HOSPITAL ENCOUNTER (OUTPATIENT)
Dept: RADIOLOGY | Facility: HOSPITAL | Age: 26
Discharge: HOME | End: 2021-12-09
Attending: STUDENT IN AN ORGANIZED HEALTH CARE EDUCATION/TRAINING PROGRAM
Payer: MEDICAID

## 2021-12-09 VITALS
HEART RATE: 59 BPM | WEIGHT: 241 LBS | BODY MASS INDEX: 37.83 KG/M2 | HEIGHT: 67 IN | SYSTOLIC BLOOD PRESSURE: 117 MMHG | DIASTOLIC BLOOD PRESSURE: 78 MMHG

## 2021-12-09 DIAGNOSIS — M25.361 KNEE INSTABILITY, RIGHT: ICD-10-CM

## 2021-12-09 DIAGNOSIS — M25.561 RIGHT KNEE PAIN, UNSPECIFIED CHRONICITY: ICD-10-CM

## 2021-12-09 DIAGNOSIS — Z01.818 PRE-OP TESTING: Primary | ICD-10-CM

## 2021-12-09 DIAGNOSIS — T84.89XA ACL GRAFT TEAR, INITIAL ENCOUNTER: Primary | ICD-10-CM

## 2021-12-09 PROCEDURE — 73560 X-RAY EXAM OF KNEE 1 OR 2: CPT | Mod: 26,RT,, | Performed by: RADIOLOGY

## 2021-12-09 PROCEDURE — 99024 POSTOP FOLLOW-UP VISIT: CPT | Mod: ,,, | Performed by: STUDENT IN AN ORGANIZED HEALTH CARE EDUCATION/TRAINING PROGRAM

## 2021-12-09 PROCEDURE — 99214 OFFICE O/P EST MOD 30 MIN: CPT | Mod: PBBFAC | Performed by: STUDENT IN AN ORGANIZED HEALTH CARE EDUCATION/TRAINING PROGRAM

## 2021-12-09 PROCEDURE — 99999 PR PBB SHADOW E&M-EST. PATIENT-LVL IV: CPT | Mod: PBBFAC,,, | Performed by: STUDENT IN AN ORGANIZED HEALTH CARE EDUCATION/TRAINING PROGRAM

## 2021-12-09 PROCEDURE — 99024 PR POST-OP FOLLOW-UP VISIT: ICD-10-PCS | Mod: ,,, | Performed by: STUDENT IN AN ORGANIZED HEALTH CARE EDUCATION/TRAINING PROGRAM

## 2021-12-09 PROCEDURE — 73560 X-RAY EXAM OF KNEE 1 OR 2: CPT | Mod: TC,RT

## 2021-12-09 PROCEDURE — 73560 XR KNEE 1 OR 2 VIEW RIGHT: ICD-10-PCS | Mod: 26,RT,, | Performed by: RADIOLOGY

## 2021-12-09 PROCEDURE — 99999 PR PBB SHADOW E&M-EST. PATIENT-LVL IV: ICD-10-PCS | Mod: PBBFAC,,, | Performed by: STUDENT IN AN ORGANIZED HEALTH CARE EDUCATION/TRAINING PROGRAM

## 2021-12-22 NOTE — ANESTHESIA PAT ROS NOTE
12/22/2021  Rachel Jonas is a 26 y.o., male.      Pre-op Assessment          Review of Systems  Anesthesia Hx:  No problems with previous Anesthesia 9/2021 [facility]  Mask Difficulty: easy mask  Airway Type: supraglottic airway/LMA  ETT Size(mm): 3.5 (Air Q)  Attempts: 1       Social:  Smoker, No Alcohol Use    Musculoskeletal:   Previous ACL repair 9/2021      Planned Procedure: Procedure(s) (LRB):  RECONSTRUCTION, KNEE, ACL, ARTHROSCOPIC (Right)  Requested Anesthesia Type:Regional  Surgeon: Dorian Carty MD  Service: Orthopedics  Known or anticipated Date of Surgery:1/12/2022    Previous anesthesia records:GETA, Nerve block for post-op pain and No problems     5'7  241#

## 2021-12-27 ENCOUNTER — TELEPHONE (OUTPATIENT)
Dept: SPORTS MEDICINE | Facility: CLINIC | Age: 26
End: 2021-12-27
Payer: MEDICAID

## 2021-12-28 ENCOUNTER — HOSPITAL ENCOUNTER (OUTPATIENT)
Dept: RADIOLOGY | Facility: HOSPITAL | Age: 26
Discharge: HOME | End: 2021-12-28
Attending: STUDENT IN AN ORGANIZED HEALTH CARE EDUCATION/TRAINING PROGRAM
Payer: MEDICAID

## 2021-12-28 DIAGNOSIS — T84.89XA ACL GRAFT TEAR, INITIAL ENCOUNTER: ICD-10-CM

## 2021-12-28 DIAGNOSIS — M25.361 KNEE INSTABILITY, RIGHT: ICD-10-CM

## 2021-12-28 PROCEDURE — 73700 CT KNEE WITHOUT CONTRAST RIGHT: ICD-10-PCS | Mod: 26,RT,, | Performed by: RADIOLOGY

## 2021-12-28 PROCEDURE — 73700 CT LOWER EXTREMITY W/O DYE: CPT | Mod: 26,RT,, | Performed by: RADIOLOGY

## 2021-12-28 PROCEDURE — 76377 3D RENDER W/INTRP POSTPROCES: CPT | Mod: 26,,, | Performed by: RADIOLOGY

## 2021-12-28 PROCEDURE — 73700 CT LOWER EXTREMITY W/O DYE: CPT | Mod: TC,RT

## 2021-12-28 PROCEDURE — 76377 3D RENDER W/INTRP POSTPROCES: CPT | Mod: TC

## 2021-12-28 PROCEDURE — 76377 CT 3D RECON WITH INDEPENDENT WS: ICD-10-PCS | Mod: 26,,, | Performed by: RADIOLOGY

## 2022-01-10 ENCOUNTER — OFFICE VISIT (OUTPATIENT)
Dept: SPORTS MEDICINE | Facility: CLINIC | Age: 27
End: 2022-01-10
Payer: MEDICAID

## 2022-01-10 ENCOUNTER — LAB VISIT (OUTPATIENT)
Dept: PRIMARY CARE CLINIC | Facility: CLINIC | Age: 27
End: 2022-01-10
Payer: MEDICAID

## 2022-01-10 VITALS
SYSTOLIC BLOOD PRESSURE: 137 MMHG | WEIGHT: 241 LBS | DIASTOLIC BLOOD PRESSURE: 80 MMHG | HEART RATE: 77 BPM | BODY MASS INDEX: 37.83 KG/M2 | HEIGHT: 67 IN

## 2022-01-10 DIAGNOSIS — Z98.890 S/P RIGHT KNEE ARTHROSCOPY: ICD-10-CM

## 2022-01-10 DIAGNOSIS — T84.89XA ACL GRAFT TEAR, INITIAL ENCOUNTER: Primary | ICD-10-CM

## 2022-01-10 DIAGNOSIS — Z01.818 PRE-OP TESTING: ICD-10-CM

## 2022-01-10 DIAGNOSIS — M25.361 KNEE INSTABILITY, RIGHT: ICD-10-CM

## 2022-01-10 PROCEDURE — 1159F MED LIST DOCD IN RCRD: CPT | Mod: CPTII,,, | Performed by: STUDENT IN AN ORGANIZED HEALTH CARE EDUCATION/TRAINING PROGRAM

## 2022-01-10 PROCEDURE — 3079F DIAST BP 80-89 MM HG: CPT | Mod: CPTII,,, | Performed by: STUDENT IN AN ORGANIZED HEALTH CARE EDUCATION/TRAINING PROGRAM

## 2022-01-10 PROCEDURE — 99213 OFFICE O/P EST LOW 20 MIN: CPT | Mod: PBBFAC | Performed by: STUDENT IN AN ORGANIZED HEALTH CARE EDUCATION/TRAINING PROGRAM

## 2022-01-10 PROCEDURE — 99499 NO LOS: ICD-10-PCS | Mod: S$PBB,,, | Performed by: STUDENT IN AN ORGANIZED HEALTH CARE EDUCATION/TRAINING PROGRAM

## 2022-01-10 PROCEDURE — 1160F PR REVIEW ALL MEDS BY PRESCRIBER/CLIN PHARMACIST DOCUMENTED: ICD-10-PCS | Mod: CPTII,,, | Performed by: STUDENT IN AN ORGANIZED HEALTH CARE EDUCATION/TRAINING PROGRAM

## 2022-01-10 PROCEDURE — 99499 UNLISTED E&M SERVICE: CPT | Mod: S$PBB,,, | Performed by: STUDENT IN AN ORGANIZED HEALTH CARE EDUCATION/TRAINING PROGRAM

## 2022-01-10 PROCEDURE — 3008F PR BODY MASS INDEX (BMI) DOCUMENTED: ICD-10-PCS | Mod: CPTII,,, | Performed by: STUDENT IN AN ORGANIZED HEALTH CARE EDUCATION/TRAINING PROGRAM

## 2022-01-10 PROCEDURE — 3075F SYST BP GE 130 - 139MM HG: CPT | Mod: CPTII,,, | Performed by: STUDENT IN AN ORGANIZED HEALTH CARE EDUCATION/TRAINING PROGRAM

## 2022-01-10 PROCEDURE — U0003 INFECTIOUS AGENT DETECTION BY NUCLEIC ACID (DNA OR RNA); SEVERE ACUTE RESPIRATORY SYNDROME CORONAVIRUS 2 (SARS-COV-2) (CORONAVIRUS DISEASE [COVID-19]), AMPLIFIED PROBE TECHNIQUE, MAKING USE OF HIGH THROUGHPUT TECHNOLOGIES AS DESCRIBED BY CMS-2020-01-R: HCPCS | Performed by: STUDENT IN AN ORGANIZED HEALTH CARE EDUCATION/TRAINING PROGRAM

## 2022-01-10 PROCEDURE — 1160F RVW MEDS BY RX/DR IN RCRD: CPT | Mod: CPTII,,, | Performed by: STUDENT IN AN ORGANIZED HEALTH CARE EDUCATION/TRAINING PROGRAM

## 2022-01-10 PROCEDURE — 3079F PR MOST RECENT DIASTOLIC BLOOD PRESSURE 80-89 MM HG: ICD-10-PCS | Mod: CPTII,,, | Performed by: STUDENT IN AN ORGANIZED HEALTH CARE EDUCATION/TRAINING PROGRAM

## 2022-01-10 PROCEDURE — 99999 PR PBB SHADOW E&M-EST. PATIENT-LVL III: ICD-10-PCS | Mod: PBBFAC,,, | Performed by: STUDENT IN AN ORGANIZED HEALTH CARE EDUCATION/TRAINING PROGRAM

## 2022-01-10 PROCEDURE — 1159F PR MEDICATION LIST DOCUMENTED IN MEDICAL RECORD: ICD-10-PCS | Mod: CPTII,,, | Performed by: STUDENT IN AN ORGANIZED HEALTH CARE EDUCATION/TRAINING PROGRAM

## 2022-01-10 PROCEDURE — 3075F PR MOST RECENT SYSTOLIC BLOOD PRESS GE 130-139MM HG: ICD-10-PCS | Mod: CPTII,,, | Performed by: STUDENT IN AN ORGANIZED HEALTH CARE EDUCATION/TRAINING PROGRAM

## 2022-01-10 PROCEDURE — 99999 PR PBB SHADOW E&M-EST. PATIENT-LVL III: CPT | Mod: PBBFAC,,, | Performed by: STUDENT IN AN ORGANIZED HEALTH CARE EDUCATION/TRAINING PROGRAM

## 2022-01-10 PROCEDURE — 3008F BODY MASS INDEX DOCD: CPT | Mod: CPTII,,, | Performed by: STUDENT IN AN ORGANIZED HEALTH CARE EDUCATION/TRAINING PROGRAM

## 2022-01-10 PROCEDURE — U0005 INFEC AGEN DETEC AMPLI PROBE: HCPCS | Performed by: STUDENT IN AN ORGANIZED HEALTH CARE EDUCATION/TRAINING PROGRAM

## 2022-01-10 RX ORDER — CELECOXIB 200 MG/1
200 CAPSULE ORAL 2 TIMES DAILY WITH MEALS
Qty: 60 CAPSULE | Refills: 0 | Status: SHIPPED | OUTPATIENT
Start: 2022-01-10 | End: 2023-07-03

## 2022-01-10 RX ORDER — PROMETHAZINE HYDROCHLORIDE 25 MG/1
25 TABLET ORAL EVERY 6 HOURS PRN
Qty: 30 TABLET | Refills: 0 | Status: SHIPPED | OUTPATIENT
Start: 2022-01-10 | End: 2023-07-03

## 2022-01-10 RX ORDER — METHOCARBAMOL 500 MG/1
500 TABLET, FILM COATED ORAL 3 TIMES DAILY PRN
Qty: 30 TABLET | Refills: 0 | Status: SHIPPED | OUTPATIENT
Start: 2022-01-10 | End: 2023-07-03

## 2022-01-10 RX ORDER — ASPIRIN 81 MG/1
81 TABLET ORAL DAILY
Qty: 28 TABLET | Refills: 0 | Status: SHIPPED | OUTPATIENT
Start: 2022-01-10 | End: 2023-07-03

## 2022-01-10 RX ORDER — OXYCODONE HYDROCHLORIDE 5 MG/1
5 TABLET ORAL EVERY 6 HOURS PRN
Qty: 28 TABLET | Refills: 0 | Status: SHIPPED | OUTPATIENT
Start: 2022-01-10 | End: 2022-01-20

## 2022-01-10 NOTE — H&P
Subjective:          Chief Complaint: Rachel Jonas is a 26 y.o. male who had concerns including Pain of the Right Knee.    Rachel Jonas is a 26 y.o. male here for follow-up for his right knee.  He previously underwent right knee arthroscopy with bone grafting of his prior ACL tunnels on 09/17/2021.  He is doing very well.  CT scan shows [employer] the bone graft.  He is ready to proceed with the 2nd stage which is revision ACL reconstruction with quadriceps tendon autograft.  Says his knee continues to feel unstable with frequent buckling.    Pain  Associated symptoms include joint swelling ( right knee).     History reviewed. No pertinent past medical history.    Current Outpatient Medications on File Prior to Visit   Medication Sig Dispense Refill    (Magic mouthwash) 1:1:1 Benadryl 12.5mg/5ml liq, aluminum & magnesium hydroxide-simehticone (Maalox), LIDOcaine viscous 2% Swish and spit 10 mLs every 4 (four) hours as needed (As needed for sore throat). for mouth sores (Patient not taking: No sig reported) 90 mL 0    acetaminophen (TYLENOL) 500 MG tablet Take 2 tablets (1,000 mg total) by mouth every 6 (six) hours as needed for Pain. (Patient not taking: No sig reported) 30 tablet 0    ibuprofen (ADVIL,MOTRIN) 600 MG tablet Take 1 tablet (600 mg total) by mouth every 6 (six) hours as needed for Pain (Take with food as needed for mild-to-moderate pain). (Patient not taking: No sig reported) 20 tablet 0    ondansetron (ZOFRAN-ODT) 4 MG TbDL Dissolve 1 tablet (4 mg total) by mouth every 8 (eight) hours as needed (Nausea). (Patient not taking: No sig reported) 20 tablet 0    tadalafiL (CIALIS) 5 MG tablet Take 1 tablet (5 mg total) by mouth daily as needed for Erectile Dysfunction. (Patient not taking: No sig reported) 30 tablet 11    traMADoL (ULTRAM) 50 mg tablet Take 1 tablet (50 mg total) by mouth every 6 (six) hours. (Patient not taking: No sig reported) 31 tablet 0    [DISCONTINUED] aspirin  (ECOTRIN) 81 MG EC tablet Take 1 tablet (81 mg total) by mouth once daily. for 14 days 14 tablet 0    [DISCONTINUED] celecoxib (CELEBREX) 200 MG capsule Take 1 capsule (200 mg total) by mouth 2 (two) times daily with meals. (Patient not taking: No sig reported) 42 capsule 0    [DISCONTINUED] oxyCODONE (ROXICODONE) 5 MG immediate release tablet Take 1-2 tablets as needed for pain every 6 hours. (Patient not taking: No sig reported) 28 tablet 0     No current facility-administered medications on file prior to visit.       Past Surgical History:   Procedure Laterality Date    ARTHROSCOPY OF KNEE Right 9/17/2021    Procedure: ARTHROSCOPY, KNEE ACL debridment with bone grafting;  Surgeon: Dorian Carty MD;  Location: [facility] OR;  Service: Orthopedics;  Laterality: Right;  no block    CIRCUMCISION, PRIMARY      HARDWARE REMOVAL Right 9/17/2021    Procedure: REMOVAL, HARDWARE;  Surgeon: Dorian Carty MD;  Location: [facility] OR;  Service: Orthopedics;  Laterality: Right;  no block       Family History   Problem Relation Age of Onset    Hypertension Mother        Social History     Socioeconomic History    Marital status: Single   Tobacco Use    Smoking status: Current Every Day Smoker     Types: Cigars    Smokeless tobacco: Never Used   Substance and Sexual Activity    Alcohol use: No    Drug use: Yes     Types: Marijuana     Comment: 2 x a week    Sexual activity: Yes     Partners: Female       Review of Systems   Constitutional: Negative.   HENT: Negative.    Eyes: Negative.    Cardiovascular: Negative.    Respiratory: Negative.    Endocrine: Negative.    Hematologic/Lymphatic: Negative.    Skin: Negative.    Musculoskeletal: Positive for joint pain (Right knee) and joint swelling ( right knee). Negative for arthritis, falls, muscle weakness and stiffness.   Neurological: Negative.    Psychiatric/Behavioral: Negative.    Allergic/Immunologic: Negative.        Pain Related Questions  Over the past 3 days, what  was your average pain during activity? (I.e. running, jogging, walking, climbing stairs, getting dressed, ect.): 9  Over the past 3 days, what was your highest pain level?: 9  Over the past 3 days, what was your lowest pain level? : 3    Other  How many nights a week are you awakened by your affected body part?: 4  Was the patient's HEIGHT measured or patient reported?: Patient Reported  Was the patient's WEIGHT measured or patient reported?: Measured      Objective:        General: Rachel is well-developed, well-nourished, appears stated age, in no acute distress, alert and oriented to time, place and person.     General    Nursing note and vitals reviewed.  Constitutional: He is oriented to person, place, and time. He appears well-developed and well-nourished. No distress.   HENT:   Head: Normocephalic and atraumatic.   Nose: Nose normal.   Eyes: EOM are normal.   Cardiovascular: Normal rate and intact distal pulses.    Pulmonary/Chest: Effort normal. No respiratory distress.   Neurological: He is alert and oriented to person, place, and time.   Psychiatric: He has a normal mood and affect. His behavior is normal. Judgment and thought content normal.     General Musculoskeletal Exam   Gait: antalgic and abnormal       Right Knee Exam     Inspection   Erythema: absent  Scars: present (Previous incisions well healed)  Swelling: present  Effusion: present  Deformity: absent  Bruising: absent    Tenderness   The patient is tender to palpation of the lateral joint line, medial joint line and condyle.    Range of Motion   Extension: -5   Flexion: 130     Tests   Meniscus   Yeimi:  Medial - positive Lateral - positive  Ligament Examination Lachman: abnormal PCL-Posterior Drawer: normal (0 to 2mm)     MCL - Valgus: normal (0 to 2mm)  LCL - Varus: normal  Patella   Passive Patellar Tilt: neutral  Patellar Tracking: normal  Patellar Glide (quadrants): Lateral - 2   Medial - 2    Left Knee Exam   Left knee exam is  normal.    Inspection   Erythema: absent  Scars: absent  Swelling: absent  Effusion: absent  Deformity: absent  Bruising: absent    Tenderness   The patient is experiencing no tenderness.     Range of Motion   Extension: -5   Flexion: 140     Tests   Meniscus   Yeimi:  Medial - negative Lateral - negative  Stability Lachman: normal (-1 to 2mm) PCL-Posterior Drawer: normal (0 to 2mm)  MCL - Valgus: normal (0 to 2mm)  LCL - Varus: normal (0 to 2mm)  Patella   Passive Patellar Tilt: neutral  Patellar Tracking: normal  Patellar Glide (Quadrants): Lateral - 2 Medial - 2    Other   Sensation: normal    Muscle Strength   Right Lower Extremity   Quadriceps:  5/5   Hamstrin/5   Left Lower Extremity   Quadriceps:  5/5   Hamstrin/5     Vascular Exam     Right Pulses  Dorsalis Pedis:      2+  Posterior Tibial:      2+        Left Pulses  Dorsalis Pedis:      2+  Posterior Tibial:      2+        Edema  Right Lower Leg: absent  Left Lower Leg: absent    Imaging:  CT scan of the right knee from 2021 personally viewed by me 01/10/2022.  There is incorporation of the majority of the bone grafting in the femoral and tibial tunnels.          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    ACL graft tear, initial encounter Yes    Knee instability, right     S/P right knee arthroscopy           Plan:         The diagnosis was explained at length to the patient his questions were answered.  We will plan on proceeding with stage II revision ACL reconstruction on 2022.  This will be with quadriceps tendon autograft, as previously discussed.    The risks, benefits and alternatives to surgery were discussed with the patient at great length.  These include bleeding, infection, vessel/nerve damage, pain, numbness, tingling, complex regional pain syndrome, hardware/surgical failure, need for further surgery, persistent instability, graft  failure, graft rupture, DVT, PE, arthritis and death.  Patient states an understanding and wishes to proceed with surgery.   All questions were answered.  No guarantees were implied or stated.     All of their questions were answered.  They will call the clinic with any questions or concerns in the interim.    Should the patient's symptoms worsen, persist, or fail to improve they should return for reevaluation and I would be happy to see them back anytime.        Dorian Carty M.D.     Please be aware that this note has been generated with the assistance of chaitanya voice-to-text.  Please excuse any spelling or grammatical errors.    Thank you for choosing Dr. Dorian Carty for your sports medicine care. It is our goal to provide you with exceptional care that will help keep you healthy, active, and get you back in the game.     If you felt that you received exemplary care today, please consider leaving feedback for Dr. Carty on [employer] at [email or web address].    Please do not hesitate to reach out to us via email, phone, or MyChart with any questions, concerns, or feedback.

## 2022-01-10 NOTE — H&P (VIEW-ONLY)
Subjective:          Chief Complaint: Rachel Jonas is a 26 y.o. male who had concerns including Pain of the Right Knee.    Rachel Jonas is a 26 y.o. male here for follow-up for his right knee.  He previously underwent right knee arthroscopy with bone grafting of his prior ACL tunnels on 09/17/2021.  He is doing very well.  CT scan shows [employer] the bone graft.  He is ready to proceed with the 2nd stage which is revision ACL reconstruction with quadriceps tendon autograft.  Says his knee continues to feel unstable with frequent buckling.    Pain  Associated symptoms include joint swelling ( right knee).     History reviewed. No pertinent past medical history.    Current Outpatient Medications on File Prior to Visit   Medication Sig Dispense Refill    (Magic mouthwash) 1:1:1 Benadryl 12.5mg/5ml liq, aluminum & magnesium hydroxide-simehticone (Maalox), LIDOcaine viscous 2% Swish and spit 10 mLs every 4 (four) hours as needed (As needed for sore throat). for mouth sores (Patient not taking: No sig reported) 90 mL 0    acetaminophen (TYLENOL) 500 MG tablet Take 2 tablets (1,000 mg total) by mouth every 6 (six) hours as needed for Pain. (Patient not taking: No sig reported) 30 tablet 0    ibuprofen (ADVIL,MOTRIN) 600 MG tablet Take 1 tablet (600 mg total) by mouth every 6 (six) hours as needed for Pain (Take with food as needed for mild-to-moderate pain). (Patient not taking: No sig reported) 20 tablet 0    ondansetron (ZOFRAN-ODT) 4 MG TbDL Dissolve 1 tablet (4 mg total) by mouth every 8 (eight) hours as needed (Nausea). (Patient not taking: No sig reported) 20 tablet 0    tadalafiL (CIALIS) 5 MG tablet Take 1 tablet (5 mg total) by mouth daily as needed for Erectile Dysfunction. (Patient not taking: No sig reported) 30 tablet 11    traMADoL (ULTRAM) 50 mg tablet Take 1 tablet (50 mg total) by mouth every 6 (six) hours. (Patient not taking: No sig reported) 31 tablet 0    [DISCONTINUED] aspirin  (ECOTRIN) 81 MG EC tablet Take 1 tablet (81 mg total) by mouth once daily. for 14 days 14 tablet 0    [DISCONTINUED] celecoxib (CELEBREX) 200 MG capsule Take 1 capsule (200 mg total) by mouth 2 (two) times daily with meals. (Patient not taking: No sig reported) 42 capsule 0    [DISCONTINUED] oxyCODONE (ROXICODONE) 5 MG immediate release tablet Take 1-2 tablets as needed for pain every 6 hours. (Patient not taking: No sig reported) 28 tablet 0     No current facility-administered medications on file prior to visit.       Past Surgical History:   Procedure Laterality Date    ARTHROSCOPY OF KNEE Right 9/17/2021    Procedure: ARTHROSCOPY, KNEE ACL debridment with bone grafting;  Surgeon: Dorian Carty MD;  Location: [facility] OR;  Service: Orthopedics;  Laterality: Right;  no block    CIRCUMCISION, PRIMARY      HARDWARE REMOVAL Right 9/17/2021    Procedure: REMOVAL, HARDWARE;  Surgeon: Dorian Carty MD;  Location: [facility] OR;  Service: Orthopedics;  Laterality: Right;  no block       Family History   Problem Relation Age of Onset    Hypertension Mother        Social History     Socioeconomic History    Marital status: Single   Tobacco Use    Smoking status: Current Every Day Smoker     Types: Cigars    Smokeless tobacco: Never Used   Substance and Sexual Activity    Alcohol use: No    Drug use: Yes     Types: Marijuana     Comment: 2 x a week    Sexual activity: Yes     Partners: Female       Review of Systems   Constitutional: Negative.   HENT: Negative.    Eyes: Negative.    Cardiovascular: Negative.    Respiratory: Negative.    Endocrine: Negative.    Hematologic/Lymphatic: Negative.    Skin: Negative.    Musculoskeletal: Positive for joint pain (Right knee) and joint swelling ( right knee). Negative for arthritis, falls, muscle weakness and stiffness.   Neurological: Negative.    Psychiatric/Behavioral: Negative.    Allergic/Immunologic: Negative.        Pain Related Questions  Over the past 3 days, what  was your average pain during activity? (I.e. running, jogging, walking, climbing stairs, getting dressed, ect.): 9  Over the past 3 days, what was your highest pain level?: 9  Over the past 3 days, what was your lowest pain level? : 3    Other  How many nights a week are you awakened by your affected body part?: 4  Was the patient's HEIGHT measured or patient reported?: Patient Reported  Was the patient's WEIGHT measured or patient reported?: Measured      Objective:        General: Rachel is well-developed, well-nourished, appears stated age, in no acute distress, alert and oriented to time, place and person.     General    Nursing note and vitals reviewed.  Constitutional: He is oriented to person, place, and time. He appears well-developed and well-nourished. No distress.   HENT:   Head: Normocephalic and atraumatic.   Nose: Nose normal.   Eyes: EOM are normal.   Cardiovascular: Normal rate and intact distal pulses.    Pulmonary/Chest: Effort normal. No respiratory distress.   Neurological: He is alert and oriented to person, place, and time.   Psychiatric: He has a normal mood and affect. His behavior is normal. Judgment and thought content normal.     General Musculoskeletal Exam   Gait: antalgic and abnormal       Right Knee Exam     Inspection   Erythema: absent  Scars: present (Previous incisions well healed)  Swelling: present  Effusion: present  Deformity: absent  Bruising: absent    Tenderness   The patient is tender to palpation of the lateral joint line, medial joint line and condyle.    Range of Motion   Extension: -5   Flexion: 130     Tests   Meniscus   Yeimi:  Medial - positive Lateral - positive  Ligament Examination Lachman: abnormal PCL-Posterior Drawer: normal (0 to 2mm)     MCL - Valgus: normal (0 to 2mm)  LCL - Varus: normal  Patella   Passive Patellar Tilt: neutral  Patellar Tracking: normal  Patellar Glide (quadrants): Lateral - 2   Medial - 2    Left Knee Exam   Left knee exam is  normal.    Inspection   Erythema: absent  Scars: absent  Swelling: absent  Effusion: absent  Deformity: absent  Bruising: absent    Tenderness   The patient is experiencing no tenderness.     Range of Motion   Extension: -5   Flexion: 140     Tests   Meniscus   Yeimi:  Medial - negative Lateral - negative  Stability Lachman: normal (-1 to 2mm) PCL-Posterior Drawer: normal (0 to 2mm)  MCL - Valgus: normal (0 to 2mm)  LCL - Varus: normal (0 to 2mm)  Patella   Passive Patellar Tilt: neutral  Patellar Tracking: normal  Patellar Glide (Quadrants): Lateral - 2 Medial - 2    Other   Sensation: normal    Muscle Strength   Right Lower Extremity   Quadriceps:  5/5   Hamstrin/5   Left Lower Extremity   Quadriceps:  5/5   Hamstrin/5     Vascular Exam     Right Pulses  Dorsalis Pedis:      2+  Posterior Tibial:      2+        Left Pulses  Dorsalis Pedis:      2+  Posterior Tibial:      2+        Edema  Right Lower Leg: absent  Left Lower Leg: absent    Imaging:  CT scan of the right knee from 2021 personally viewed by me 01/10/2022.  There is incorporation of the majority of the bone grafting in the femoral and tibial tunnels.          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    ACL graft tear, initial encounter Yes    Knee instability, right     S/P right knee arthroscopy           Plan:         The diagnosis was explained at length to the patient his questions were answered.  We will plan on proceeding with stage II revision ACL reconstruction on 2022.  This will be with quadriceps tendon autograft, as previously discussed.    The risks, benefits and alternatives to surgery were discussed with the patient at great length.  These include bleeding, infection, vessel/nerve damage, pain, numbness, tingling, complex regional pain syndrome, hardware/surgical failure, need for further surgery, persistent instability, graft  failure, graft rupture, DVT, PE, arthritis and death.  Patient states an understanding and wishes to proceed with surgery.   All questions were answered.  No guarantees were implied or stated.     All of their questions were answered.  They will call the clinic with any questions or concerns in the interim.    Should the patient's symptoms worsen, persist, or fail to improve they should return for reevaluation and I would be happy to see them back anytime.        Dorian Carty M.D.     Please be aware that this note has been generated with the assistance of chaitanya voice-to-text.  Please excuse any spelling or grammatical errors.    Thank you for choosing Dr. Dorian Carty for your sports medicine care. It is our goal to provide you with exceptional care that will help keep you healthy, active, and get you back in the game.     If you felt that you received exemplary care today, please consider leaving feedback for Dr. Carty on [email or web address].    Please do not hesitate to reach out to us via email, phone, or MyChart with any questions, concerns, or feedback.

## 2022-01-10 NOTE — PROGRESS NOTES
Subjective:          Chief Complaint: Rachel Jonas is a 26 y.o. male who had concerns including Pain of the Right Knee.    Rachel Jonas is a 26 y.o. male here for follow-up for his right knee.  He previously underwent right knee arthroscopy with bone grafting of his prior ACL tunnels on 09/17/2021.  He is doing very well.  CT scan shows well corporation the bone graft.  He is ready to proceed with the 2nd stage which is revision ACL reconstruction with quadriceps tendon autograft.  Says his knee continues to feel unstable with frequent buckling.    History reviewed. No pertinent past medical history.    Current Outpatient Medications on File Prior to Visit   Medication Sig Dispense Refill    (Magic mouthwash) 1:1:1 Benadryl 12.5mg/5ml liq, aluminum & magnesium hydroxide-simehticone (Maalox), LIDOcaine viscous 2% Swish and spit 10 mLs every 4 (four) hours as needed (As needed for sore throat). for mouth sores (Patient not taking: No sig reported) 90 mL 0    acetaminophen (TYLENOL) 500 MG tablet Take 2 tablets (1,000 mg total) by mouth every 6 (six) hours as needed for Pain. (Patient not taking: No sig reported) 30 tablet 0    ibuprofen (ADVIL,MOTRIN) 600 MG tablet Take 1 tablet (600 mg total) by mouth every 6 (six) hours as needed for Pain (Take with food as needed for mild-to-moderate pain). (Patient not taking: No sig reported) 20 tablet 0    ondansetron (ZOFRAN-ODT) 4 MG TbDL Dissolve 1 tablet (4 mg total) by mouth every 8 (eight) hours as needed (Nausea). (Patient not taking: No sig reported) 20 tablet 0    tadalafiL (CIALIS) 5 MG tablet Take 1 tablet (5 mg total) by mouth daily as needed for Erectile Dysfunction. (Patient not taking: No sig reported) 30 tablet 11    traMADoL (ULTRAM) 50 mg tablet Take 1 tablet (50 mg total) by mouth every 6 (six) hours. (Patient not taking: No sig reported) 31 tablet 0    [DISCONTINUED] aspirin (ECOTRIN) 81 MG EC tablet Take 1 tablet (81 mg total) by mouth once  daily. for 14 days 14 tablet 0    [DISCONTINUED] celecoxib (CELEBREX) 200 MG capsule Take 1 capsule (200 mg total) by mouth 2 (two) times daily with meals. (Patient not taking: No sig reported) 42 capsule 0    [DISCONTINUED] oxyCODONE (ROXICODONE) 5 MG immediate release tablet Take 1-2 tablets as needed for pain every 6 hours. (Patient not taking: No sig reported) 28 tablet 0     No current facility-administered medications on file prior to visit.       Past Surgical History:   Procedure Laterality Date    ARTHROSCOPY OF KNEE Right 9/17/2021    Procedure: ARTHROSCOPY, KNEE ACL debridment with bone grafting;  Surgeon: Dorian Carty MD;  Location: [facility] OR;  Service: Orthopedics;  Laterality: Right;  no block    CIRCUMCISION, PRIMARY      HARDWARE REMOVAL Right 9/17/2021    Procedure: REMOVAL, HARDWARE;  Surgeon: Dorian Carty MD;  Location: [facility] OR;  Service: Orthopedics;  Laterality: Right;  no block       Family History   Problem Relation Age of Onset    Hypertension Mother        Social History     Socioeconomic History    Marital status: Single   Tobacco Use    Smoking status: Current Every Day Smoker     Types: Cigars    Smokeless tobacco: Never Used   Substance and Sexual Activity    Alcohol use: No    Drug use: Yes     Types: Marijuana     Comment: 2 x a week    Sexual activity: Yes     Partners: Female       Review of Systems   Constitutional: Negative.   HENT: Negative.    Eyes: Negative.    Cardiovascular: Negative.    Respiratory: Negative.    Endocrine: Negative.    Hematologic/Lymphatic: Negative.    Skin: Negative.    Musculoskeletal: Positive for joint pain (Right knee) and joint swelling ( right knee). Negative for arthritis, falls, muscle weakness and stiffness.   Neurological: Negative.    Psychiatric/Behavioral: Negative.    Allergic/Immunologic: Negative.        Pain Related Questions  Over the past 3 days, what was your average pain during activity? (I.e. running, jogging,  walking, climbing stairs, getting dressed, ect.): 9  Over the past 3 days, what was your highest pain level?: 9  Over the past 3 days, what was your lowest pain level? : 3    Other  How many nights a week are you awakened by your affected body part?: 4  Was the patient's HEIGHT measured or patient reported?: Patient Reported  Was the patient's WEIGHT measured or patient reported?: Measured      Objective:        General: Rachel is well-developed, well-nourished, appears stated age, in no acute distress, alert and oriented to time, place and person.     General    Nursing note and vitals reviewed.  Constitutional: He is oriented to person, place, and time. He appears well-developed and well-nourished. No distress.   HENT:   Head: Normocephalic and atraumatic.   Nose: Nose normal.   Eyes: EOM are normal.   Cardiovascular: Normal rate and intact distal pulses.    Pulmonary/Chest: Effort normal. No respiratory distress.   Neurological: He is alert and oriented to person, place, and time.   Psychiatric: He has a normal mood and affect. His behavior is normal. Judgment and thought content normal.     General Musculoskeletal Exam   Gait: antalgic and abnormal       Right Knee Exam     Inspection   Erythema: absent  Scars: present (Previous incisions well healed)  Swelling: present  Effusion: present  Deformity: absent  Bruising: absent    Tenderness   The patient is tender to palpation of the lateral joint line, medial joint line and condyle.    Range of Motion   Extension: -5   Flexion: 130     Tests   Meniscus   Yeimi:  Medial - positive Lateral - positive  Ligament Examination Lachman: abnormal PCL-Posterior Drawer: normal (0 to 2mm)     MCL - Valgus: normal (0 to 2mm)  LCL - Varus: normal  Patella   Passive Patellar Tilt: neutral  Patellar Tracking: normal  Patellar Glide (quadrants): Lateral - 2   Medial - 2    Left Knee Exam   Left knee exam is normal.    Inspection   Erythema: absent  Scars: absent  Swelling:  absent  Effusion: absent  Deformity: absent  Bruising: absent    Tenderness   The patient is experiencing no tenderness.     Range of Motion   Extension: -5   Flexion: 140     Tests   Meniscus   Yeimi:  Medial - negative Lateral - negative  Stability Lachman: normal (-1 to 2mm) PCL-Posterior Drawer: normal (0 to 2mm)  MCL - Valgus: normal (0 to 2mm)  LCL - Varus: normal (0 to 2mm)  Patella   Passive Patellar Tilt: neutral  Patellar Tracking: normal  Patellar Glide (Quadrants): Lateral - 2 Medial - 2    Other   Sensation: normal    Muscle Strength   Right Lower Extremity   Quadriceps:  5/5   Hamstrin/5   Left Lower Extremity   Quadriceps:  5/5   Hamstrin/5     Vascular Exam     Right Pulses  Dorsalis Pedis:      2+  Posterior Tibial:      2+        Left Pulses  Dorsalis Pedis:      2+  Posterior Tibial:      2+        Edema  Right Lower Leg: absent  Left Lower Leg: absent    Imaging:  CT scan of the right knee from 2021 personally viewed by me 01/10/2022.  There is incorporation of the majority of the bone grafting in the femoral and tibial tunnels.          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    ACL graft tear, initial encounter Yes    Knee instability, right     S/P right knee arthroscopy           Plan:         The diagnosis was explained at length to the patient his questions were answered.  We will plan on proceeding with stage II revision ACL reconstruction on 2022.  This will be with quadriceps tendon autograft, as previously discussed.    The risks, benefits and alternatives to surgery were discussed with the patient at great length.  These include bleeding, infection, vessel/nerve damage, pain, numbness, tingling, complex regional pain syndrome, hardware/surgical failure, need for further surgery, persistent instability, graft failure, graft rupture, DVT, PE, arthritis and death.  Patient states an  understanding and wishes to proceed with surgery.   All questions were answered.  No guarantees were implied or stated.     All of their questions were answered.  They will call the clinic with any questions or concerns in the interim.    Should the patient's symptoms worsen, persist, or fail to improve they should return for reevaluation and I would be happy to see them back anytime.        Dorian Carty M.D.     Please be aware that this note has been generated with the assistance of [facility] voice-to-text.  Please excuse any spelling or grammatical errors.    Thank you for choosing Dr. Dorian Carty for your sports medicine care. It is our goal to provide you with exceptional care that will help keep you healthy, active, and get you back in the game.     If you felt that you received exemplary care today, please consider leaving feedback for Dr. Carty on [employer] at [email or web address]    Please do not hesitate to reach out to us via email, phone, or MyChart with any questions, concerns, or feedback.

## 2022-01-11 ENCOUNTER — TELEPHONE (OUTPATIENT)
Dept: SPORTS MEDICINE | Facility: CLINIC | Age: 27
End: 2022-01-11
Payer: MEDICAID

## 2022-01-11 ENCOUNTER — ANESTHESIA EVENT (OUTPATIENT)
Dept: SURGERY | Facility: HOSPITAL | Age: 27
End: 2022-01-11
Payer: MEDICAID

## 2022-01-11 LAB — SARS-COV-2 RNA RESP QL NAA+PROBE: NOT DETECTED

## 2022-01-11 NOTE — TELEPHONE ENCOUNTER
Spoke with patient to notify him of arrival time for surgery on 1/12/22. He was instructed to arrive at 8:30am to [facility] [place]. He understood.       Ata Malhotra MS, OT  Sports Medicine Assistant  [facility]

## 2022-01-12 ENCOUNTER — HOSPITAL ENCOUNTER (OUTPATIENT)
Facility: HOSPITAL | Age: 27
Discharge: HOME | End: 2022-01-12
Attending: STUDENT IN AN ORGANIZED HEALTH CARE EDUCATION/TRAINING PROGRAM | Admitting: STUDENT IN AN ORGANIZED HEALTH CARE EDUCATION/TRAINING PROGRAM
Payer: MEDICAID

## 2022-01-12 ENCOUNTER — ANESTHESIA (OUTPATIENT)
Dept: SURGERY | Facility: HOSPITAL | Age: 27
End: 2022-01-12
Payer: MEDICAID

## 2022-01-12 VITALS
WEIGHT: 242 LBS | DIASTOLIC BLOOD PRESSURE: 95 MMHG | HEART RATE: 62 BPM | HEIGHT: 67 IN | SYSTOLIC BLOOD PRESSURE: 165 MMHG | RESPIRATION RATE: 20 BRPM | BODY MASS INDEX: 37.98 KG/M2 | TEMPERATURE: 98 F | OXYGEN SATURATION: 99 %

## 2022-01-12 DIAGNOSIS — S83.519A ACL (ANTERIOR CRUCIATE LIGAMENT) TEAR: ICD-10-CM

## 2022-01-12 PROBLEM — M25.361 KNEE INSTABILITY, RIGHT: Status: ACTIVE | Noted: 2022-01-12

## 2022-01-12 PROCEDURE — 27201423 OPTIME MED/SURG SUP & DEVICES STERILE SUPPLY: Performed by: STUDENT IN AN ORGANIZED HEALTH CARE EDUCATION/TRAINING PROGRAM

## 2022-01-12 PROCEDURE — 76942 PR U/S GUIDANCE FOR NEEDLE GUIDANCE: ICD-10-PCS | Mod: 26,,, | Performed by: ANESTHESIOLOGY

## 2022-01-12 PROCEDURE — 76942 ECHO GUIDE FOR BIOPSY: CPT | Performed by: STUDENT IN AN ORGANIZED HEALTH CARE EDUCATION/TRAINING PROGRAM

## 2022-01-12 PROCEDURE — 01400 ANES OPN/ARTHRS KNEE JT NOS: CPT | Performed by: STUDENT IN AN ORGANIZED HEALTH CARE EDUCATION/TRAINING PROGRAM

## 2022-01-12 PROCEDURE — C1713 ANCHOR/SCREW BN/BN,TIS/BN: HCPCS | Performed by: STUDENT IN AN ORGANIZED HEALTH CARE EDUCATION/TRAINING PROGRAM

## 2022-01-12 PROCEDURE — 25000003 PHARM REV CODE 250: Performed by: ANESTHESIOLOGY

## 2022-01-12 PROCEDURE — D9220A PRA ANESTHESIA: Mod: ANES,,, | Performed by: ANESTHESIOLOGY

## 2022-01-12 PROCEDURE — 37000008 HC ANESTHESIA 1ST 15 MINUTES: Performed by: STUDENT IN AN ORGANIZED HEALTH CARE EDUCATION/TRAINING PROGRAM

## 2022-01-12 PROCEDURE — 25000003 PHARM REV CODE 250: Performed by: NURSE ANESTHETIST, CERTIFIED REGISTERED

## 2022-01-12 PROCEDURE — 64448 PR NERVE BLOCK INJ, ANES/STEROID, FEMORAL, CONT INFUSION, INCL IMAG GUIDANCE: ICD-10-PCS | Mod: 59,RT,, | Performed by: ANESTHESIOLOGY

## 2022-01-12 PROCEDURE — 37000009 HC ANESTHESIA EA ADD 15 MINS: Performed by: STUDENT IN AN ORGANIZED HEALTH CARE EDUCATION/TRAINING PROGRAM

## 2022-01-12 PROCEDURE — 63600175 PHARM REV CODE 636 W HCPCS: Performed by: NURSE ANESTHETIST, CERTIFIED REGISTERED

## 2022-01-12 PROCEDURE — D9220A PRA ANESTHESIA: ICD-10-PCS | Mod: CRNA,,, | Performed by: NURSE ANESTHETIST, CERTIFIED REGISTERED

## 2022-01-12 PROCEDURE — 71000015 HC POSTOP RECOV 1ST HR: Performed by: STUDENT IN AN ORGANIZED HEALTH CARE EDUCATION/TRAINING PROGRAM

## 2022-01-12 PROCEDURE — D9220A PRA ANESTHESIA: Mod: CRNA,,, | Performed by: NURSE ANESTHETIST, CERTIFIED REGISTERED

## 2022-01-12 PROCEDURE — 64999 UNLISTED PX NERVOUS SYSTEM: CPT | Mod: ,,, | Performed by: ANESTHESIOLOGY

## 2022-01-12 PROCEDURE — 76942 ECHO GUIDE FOR BIOPSY: CPT | Mod: 26,,, | Performed by: ANESTHESIOLOGY

## 2022-01-12 PROCEDURE — 63600175 PHARM REV CODE 636 W HCPCS: Performed by: ANESTHESIOLOGY

## 2022-01-12 PROCEDURE — 29888 ARTHRS AID ACL RPR/AGMNTJ: CPT | Mod: RT,,, | Performed by: STUDENT IN AN ORGANIZED HEALTH CARE EDUCATION/TRAINING PROGRAM

## 2022-01-12 PROCEDURE — 99900035 HC TECH TIME PER 15 MIN (STAT)

## 2022-01-12 PROCEDURE — 71000033 HC RECOVERY, INTIAL HOUR: Performed by: STUDENT IN AN ORGANIZED HEALTH CARE EDUCATION/TRAINING PROGRAM

## 2022-01-12 PROCEDURE — 36000710: Performed by: STUDENT IN AN ORGANIZED HEALTH CARE EDUCATION/TRAINING PROGRAM

## 2022-01-12 PROCEDURE — 29888 PR KNEE SCOPE,AID ANT CRUCIATE REPAIR: ICD-10-PCS | Mod: RT,,, | Performed by: STUDENT IN AN ORGANIZED HEALTH CARE EDUCATION/TRAINING PROGRAM

## 2022-01-12 PROCEDURE — D9220A PRA ANESTHESIA: ICD-10-PCS | Mod: ANES,,, | Performed by: ANESTHESIOLOGY

## 2022-01-12 PROCEDURE — 64450 NJX AA&/STRD OTHER PN/BRANCH: CPT | Performed by: STUDENT IN AN ORGANIZED HEALTH CARE EDUCATION/TRAINING PROGRAM

## 2022-01-12 PROCEDURE — 71000039 HC RECOVERY, EACH ADD'L HOUR: Performed by: STUDENT IN AN ORGANIZED HEALTH CARE EDUCATION/TRAINING PROGRAM

## 2022-01-12 PROCEDURE — 63600175 PHARM REV CODE 636 W HCPCS: Performed by: STUDENT IN AN ORGANIZED HEALTH CARE EDUCATION/TRAINING PROGRAM

## 2022-01-12 PROCEDURE — 36000711: Performed by: STUDENT IN AN ORGANIZED HEALTH CARE EDUCATION/TRAINING PROGRAM

## 2022-01-12 PROCEDURE — 25000003 PHARM REV CODE 250: Performed by: STUDENT IN AN ORGANIZED HEALTH CARE EDUCATION/TRAINING PROGRAM

## 2022-01-12 PROCEDURE — 64448 NJX AA&/STRD FEM NRV NFS IMG: CPT | Mod: 59,RT,, | Performed by: ANESTHESIOLOGY

## 2022-01-12 PROCEDURE — 64999 PR BLOCK, IPACK: ICD-10-PCS | Mod: ,,, | Performed by: ANESTHESIOLOGY

## 2022-01-12 PROCEDURE — 94761 N-INVAS EAR/PLS OXIMETRY MLT: CPT

## 2022-01-12 DEVICE — SCREW FASTTHREAD INTFR 10X30MM: Type: IMPLANTABLE DEVICE | Site: KNEE | Status: FUNCTIONAL

## 2022-01-12 DEVICE — SYS SWIVELOCK ANCH 4.75X19.1MM: Type: IMPLANTABLE DEVICE | Site: KNEE | Status: FUNCTIONAL

## 2022-01-12 DEVICE — IMPLANTABLE DEVICE: Type: IMPLANTABLE DEVICE | Site: KNEE | Status: FUNCTIONAL

## 2022-01-12 RX ORDER — ROPIVACAINE HYDROCHLORIDE 5 MG/ML
INJECTION, SOLUTION EPIDURAL; INFILTRATION; PERINEURAL
Status: COMPLETED | OUTPATIENT
Start: 2022-01-12 | End: 2022-01-12

## 2022-01-12 RX ORDER — KETAMINE HCL IN 0.9 % NACL 50 MG/5 ML
SYRINGE (ML) INTRAVENOUS
Status: DISCONTINUED | OUTPATIENT
Start: 2022-01-12 | End: 2022-01-12

## 2022-01-12 RX ORDER — CARBOXYMETHYLCELLULOSE SODIUM 5 MG/ML
SOLUTION/ DROPS OPHTHALMIC
Status: DISCONTINUED | OUTPATIENT
Start: 2022-01-12 | End: 2022-01-12

## 2022-01-12 RX ORDER — ONDANSETRON 2 MG/ML
INJECTION INTRAMUSCULAR; INTRAVENOUS
Status: DISCONTINUED | OUTPATIENT
Start: 2022-01-12 | End: 2022-01-12

## 2022-01-12 RX ORDER — EPINEPHRINE 1 MG/ML
INJECTION INTRAMUSCULAR; INTRAVENOUS; SUBCUTANEOUS
Status: DISCONTINUED | OUTPATIENT
Start: 2022-01-12 | End: 2022-01-12 | Stop reason: HOSPADM

## 2022-01-12 RX ORDER — HYDROMORPHONE HYDROCHLORIDE 2 MG/ML
INJECTION, SOLUTION INTRAMUSCULAR; INTRAVENOUS; SUBCUTANEOUS
Status: DISCONTINUED | OUTPATIENT
Start: 2022-01-12 | End: 2022-01-12

## 2022-01-12 RX ORDER — CEFAZOLIN SODIUM 1 G/3ML
INJECTION, POWDER, FOR SOLUTION INTRAMUSCULAR; INTRAVENOUS
Status: DISCONTINUED | OUTPATIENT
Start: 2022-01-12 | End: 2022-01-12

## 2022-01-12 RX ORDER — ACETAMINOPHEN 500 MG
1000 TABLET ORAL
Status: COMPLETED | OUTPATIENT
Start: 2022-01-12 | End: 2022-01-12

## 2022-01-12 RX ORDER — FENTANYL CITRATE 50 UG/ML
25-200 INJECTION, SOLUTION INTRAMUSCULAR; INTRAVENOUS EVERY 5 MIN PRN
Status: DISCONTINUED | OUTPATIENT
Start: 2022-01-12 | End: 2022-01-12 | Stop reason: HOSPADM

## 2022-01-12 RX ORDER — METOPROLOL TARTRATE 1 MG/ML
INJECTION, SOLUTION INTRAVENOUS
Status: DISCONTINUED | OUTPATIENT
Start: 2022-01-12 | End: 2022-01-12

## 2022-01-12 RX ORDER — FENTANYL CITRATE 50 UG/ML
25 INJECTION, SOLUTION INTRAMUSCULAR; INTRAVENOUS EVERY 5 MIN PRN
Status: COMPLETED | OUTPATIENT
Start: 2022-01-12 | End: 2022-01-12

## 2022-01-12 RX ORDER — LIDOCAINE HYDROCHLORIDE 20 MG/ML
INJECTION INTRAVENOUS
Status: DISCONTINUED | OUTPATIENT
Start: 2022-01-12 | End: 2022-01-12

## 2022-01-12 RX ORDER — METOCLOPRAMIDE HYDROCHLORIDE 5 MG/ML
5 INJECTION INTRAMUSCULAR; INTRAVENOUS EVERY 6 HOURS PRN
Status: DISCONTINUED | OUTPATIENT
Start: 2022-01-12 | End: 2022-01-12 | Stop reason: HOSPADM

## 2022-01-12 RX ORDER — CEFAZOLIN SODIUM 2 G/50ML
2 SOLUTION INTRAVENOUS
Status: DISCONTINUED | OUTPATIENT
Start: 2022-01-12 | End: 2022-01-12 | Stop reason: HOSPADM

## 2022-01-12 RX ORDER — ACETAMINOPHEN 325 MG/1
650 TABLET ORAL EVERY 4 HOURS PRN
Status: DISCONTINUED | OUTPATIENT
Start: 2022-01-12 | End: 2022-01-12 | Stop reason: HOSPADM

## 2022-01-12 RX ORDER — ROPIVACAINE HYDROCHLORIDE 5 MG/ML
INJECTION, SOLUTION EPIDURAL; INFILTRATION; PERINEURAL
Status: COMPLETED
Start: 2022-01-12 | End: 2022-01-12

## 2022-01-12 RX ORDER — FENTANYL CITRATE 50 UG/ML
INJECTION, SOLUTION INTRAMUSCULAR; INTRAVENOUS
Status: DISCONTINUED | OUTPATIENT
Start: 2022-01-12 | End: 2022-01-12

## 2022-01-12 RX ORDER — ROPIVACAINE HYDROCHLORIDE 2 MG/ML
INJECTION, SOLUTION EPIDURAL; INFILTRATION; PERINEURAL CONTINUOUS
Status: DISCONTINUED | OUTPATIENT
Start: 2022-01-12 | End: 2022-01-12 | Stop reason: HOSPADM

## 2022-01-12 RX ORDER — TAMSULOSIN HYDROCHLORIDE 0.4 MG/1
0.4 CAPSULE ORAL
Status: COMPLETED | OUTPATIENT
Start: 2022-01-12 | End: 2022-01-12

## 2022-01-12 RX ORDER — OXYCODONE HYDROCHLORIDE 5 MG/1
10 TABLET ORAL EVERY 4 HOURS PRN
Status: DISCONTINUED | OUTPATIENT
Start: 2022-01-12 | End: 2022-01-12 | Stop reason: HOSPADM

## 2022-01-12 RX ORDER — MIDAZOLAM HYDROCHLORIDE 1 MG/ML
.5-4 INJECTION INTRAMUSCULAR; INTRAVENOUS
Status: DISCONTINUED | OUTPATIENT
Start: 2022-01-12 | End: 2022-01-12 | Stop reason: HOSPADM

## 2022-01-12 RX ORDER — ONDANSETRON 2 MG/ML
4 INJECTION INTRAMUSCULAR; INTRAVENOUS EVERY 12 HOURS PRN
Status: DISCONTINUED | OUTPATIENT
Start: 2022-01-12 | End: 2022-01-12 | Stop reason: HOSPADM

## 2022-01-12 RX ORDER — ROCURONIUM BROMIDE 10 MG/ML
INJECTION, SOLUTION INTRAVENOUS
Status: DISCONTINUED | OUTPATIENT
Start: 2022-01-12 | End: 2022-01-12

## 2022-01-12 RX ORDER — OXYCODONE HYDROCHLORIDE 5 MG/1
5 TABLET ORAL
Status: DISCONTINUED | OUTPATIENT
Start: 2022-01-12 | End: 2022-01-12 | Stop reason: HOSPADM

## 2022-01-12 RX ORDER — PROPOFOL 10 MG/ML
VIAL (ML) INTRAVENOUS
Status: DISCONTINUED | OUTPATIENT
Start: 2022-01-12 | End: 2022-01-12

## 2022-01-12 RX ORDER — DEXMEDETOMIDINE HYDROCHLORIDE 100 UG/ML
INJECTION, SOLUTION INTRAVENOUS
Status: DISCONTINUED | OUTPATIENT
Start: 2022-01-12 | End: 2022-01-12

## 2022-01-12 RX ORDER — METHOCARBAMOL 500 MG/1
1000 TABLET, FILM COATED ORAL ONCE
Status: COMPLETED | OUTPATIENT
Start: 2022-01-12 | End: 2022-01-12

## 2022-01-12 RX ORDER — NEOSTIGMINE METHYLSULFATE 1 MG/ML
INJECTION, SOLUTION INTRAVENOUS
Status: DISCONTINUED | OUTPATIENT
Start: 2022-01-12 | End: 2022-01-12

## 2022-01-12 RX ORDER — NICARDIPINE HYDROCHLORIDE 2.5 MG/ML
INJECTION INTRAVENOUS
Status: DISCONTINUED | OUTPATIENT
Start: 2022-01-12 | End: 2022-01-12

## 2022-01-12 RX ORDER — VANCOMYCIN HYDROCHLORIDE 1 G/20ML
INJECTION, POWDER, LYOPHILIZED, FOR SOLUTION INTRAVENOUS
Status: DISCONTINUED | OUTPATIENT
Start: 2022-01-12 | End: 2022-01-12 | Stop reason: HOSPADM

## 2022-01-12 RX ORDER — CELECOXIB 200 MG/1
400 CAPSULE ORAL ONCE
Status: COMPLETED | OUTPATIENT
Start: 2022-01-12 | End: 2022-01-12

## 2022-01-12 RX ORDER — HYDROCODONE BITARTRATE AND ACETAMINOPHEN 5; 325 MG/1; MG/1
1 TABLET ORAL EVERY 4 HOURS PRN
Status: DISCONTINUED | OUTPATIENT
Start: 2022-01-12 | End: 2022-01-12 | Stop reason: HOSPADM

## 2022-01-12 RX ORDER — DEXAMETHASONE SODIUM PHOSPHATE 4 MG/ML
INJECTION, SOLUTION INTRA-ARTICULAR; INTRALESIONAL; INTRAMUSCULAR; INTRAVENOUS; SOFT TISSUE
Status: DISCONTINUED | OUTPATIENT
Start: 2022-01-12 | End: 2022-01-12

## 2022-01-12 RX ADMIN — DEXMEDETOMIDINE HYDROCHLORIDE 8 MCG: 100 INJECTION, SOLUTION, CONCENTRATE INTRAVENOUS at 10:01

## 2022-01-12 RX ADMIN — FENTANYL CITRATE 50 MCG: 50 INJECTION, SOLUTION INTRAMUSCULAR; INTRAVENOUS at 10:01

## 2022-01-12 RX ADMIN — MIDAZOLAM HYDROCHLORIDE 2 MG: 1 INJECTION, SOLUTION INTRAMUSCULAR; INTRAVENOUS at 09:01

## 2022-01-12 RX ADMIN — NICARDIPINE HYDROCHLORIDE 0.2 MCG: 2.5 INJECTION INTRAVENOUS at 11:01

## 2022-01-12 RX ADMIN — ACETAMINOPHEN 1000 MG: 500 TABLET ORAL at 09:01

## 2022-01-12 RX ADMIN — Medication 10 MG: at 11:01

## 2022-01-12 RX ADMIN — ROPIVACAINE HYDROCHLORIDE 10 ML: 5 INJECTION, SOLUTION EPIDURAL; INFILTRATION; PERINEURAL at 09:01

## 2022-01-12 RX ADMIN — DEXMEDETOMIDINE HYDROCHLORIDE 4 MCG: 100 INJECTION, SOLUTION, CONCENTRATE INTRAVENOUS at 10:01

## 2022-01-12 RX ADMIN — FENTANYL CITRATE 25 MCG: 50 INJECTION INTRAMUSCULAR; INTRAVENOUS at 01:01

## 2022-01-12 RX ADMIN — METOPROLOL TARTRATE 1 MG: 5 INJECTION, SOLUTION INTRAVENOUS at 10:01

## 2022-01-12 RX ADMIN — ROCURONIUM BROMIDE 50 MG: 10 INJECTION, SOLUTION INTRAVENOUS at 10:01

## 2022-01-12 RX ADMIN — DEXAMETHASONE SODIUM PHOSPHATE 8 MG: 4 INJECTION, SOLUTION INTRAMUSCULAR; INTRAVENOUS at 10:01

## 2022-01-12 RX ADMIN — CELECOXIB 400 MG: 200 CAPSULE ORAL at 09:01

## 2022-01-12 RX ADMIN — LIDOCAINE HYDROCHLORIDE 100 MG: 20 INJECTION, SOLUTION INTRAVENOUS at 10:01

## 2022-01-12 RX ADMIN — FENTANYL CITRATE 100 MCG: 50 INJECTION, SOLUTION INTRAMUSCULAR; INTRAVENOUS at 10:01

## 2022-01-12 RX ADMIN — ONDANSETRON 4 MG: 2 INJECTION, SOLUTION INTRAMUSCULAR; INTRAVENOUS at 12:01

## 2022-01-12 RX ADMIN — CEFAZOLIN 2 G: 330 INJECTION, POWDER, FOR SOLUTION INTRAMUSCULAR; INTRAVENOUS at 10:01

## 2022-01-12 RX ADMIN — CARBOXYMETHYLCELLULOSE SODIUM 1 DROP: 5 SOLUTION/ DROPS OPHTHALMIC at 10:01

## 2022-01-12 RX ADMIN — MIDAZOLAM HYDROCHLORIDE 4 MG: 1 INJECTION, SOLUTION INTRAMUSCULAR; INTRAVENOUS at 09:01

## 2022-01-12 RX ADMIN — HYDROMORPHONE HYDROCHLORIDE 0.4 MG: 2 INJECTION, SOLUTION INTRAMUSCULAR; INTRAVENOUS; SUBCUTANEOUS at 12:01

## 2022-01-12 RX ADMIN — SODIUM CHLORIDE: 0.9 INJECTION, SOLUTION INTRAVENOUS at 09:01

## 2022-01-12 RX ADMIN — TAMSULOSIN HYDROCHLORIDE 0.4 MG: 0.4 CAPSULE ORAL at 09:01

## 2022-01-12 RX ADMIN — OXYCODONE 10 MG: 5 TABLET ORAL at 01:01

## 2022-01-12 RX ADMIN — CARBOXYMETHYLCELLULOSE SODIUM 1 DROP: 5 SOLUTION/ DROPS OPHTHALMIC at 12:01

## 2022-01-12 RX ADMIN — Medication 30 MG: at 10:01

## 2022-01-12 RX ADMIN — SODIUM CHLORIDE, SODIUM GLUCONATE, SODIUM ACETATE, POTASSIUM CHLORIDE, MAGNESIUM CHLORIDE, SODIUM PHOSPHATE, DIBASIC, AND POTASSIUM PHOSPHATE: .53; .5; .37; .037; .03; .012; .00082 INJECTION, SOLUTION INTRAVENOUS at 10:01

## 2022-01-12 RX ADMIN — PROPOFOL 200 MG: 10 INJECTION, EMULSION INTRAVENOUS at 10:01

## 2022-01-12 RX ADMIN — NEOSTIGMINE METHYLSULFATE 3 MG: 1 INJECTION INTRAVENOUS at 12:01

## 2022-01-12 RX ADMIN — PROPOFOL 50 MG: 10 INJECTION, EMULSION INTRAVENOUS at 12:01

## 2022-01-12 RX ADMIN — METHOCARBAMOL 1000 MG: 500 TABLET ORAL at 02:01

## 2022-01-12 RX ADMIN — Medication: at 01:01

## 2022-01-12 RX ADMIN — FENTANYL CITRATE 25 MCG: 50 INJECTION INTRAMUSCULAR; INTRAVENOUS at 02:01

## 2022-01-12 RX ADMIN — GLYCOPYRROLATE 0.4 MG: 0.2 INJECTION, SOLUTION INTRAMUSCULAR; INTRAVENOUS at 12:01

## 2022-01-12 NOTE — ANESTHESIA PREPROCEDURE EVALUATION
01/12/2022  Rachel Jonas is a 26 y.o., male   Pre-operative evaluation for Procedure(s) (LRB):  RECONSTRUCTION, KNEE, ACL, ARTHROSCOPIC (Right)    Rachel Jonas is a 26 y.o. male     Patient Active Problem List   Diagnosis    Sprain of right hand    ACL graft tear, initial encounter       Review of patient's allergies indicates:  No Known Allergies    No current facility-administered medications on file prior to encounter.     Current Outpatient Medications on File Prior to Encounter   Medication Sig Dispense Refill    (Magic mouthwash) 1:1:1 Benadryl 12.5mg/5ml liq, aluminum & magnesium hydroxide-simehticone (Maalox), LIDOcaine viscous 2% Swish and spit 10 mLs every 4 (four) hours as needed (As needed for sore throat). for mouth sores (Patient not taking: No sig reported) 90 mL 0    acetaminophen (TYLENOL) 500 MG tablet Take 2 tablets (1,000 mg total) by mouth every 6 (six) hours as needed for Pain. (Patient not taking: No sig reported) 30 tablet 0    ibuprofen (ADVIL,MOTRIN) 600 MG tablet Take 1 tablet (600 mg total) by mouth every 6 (six) hours as needed for Pain (Take with food as needed for mild-to-moderate pain). (Patient not taking: No sig reported) 20 tablet 0    ondansetron (ZOFRAN-ODT) 4 MG TbDL Dissolve 1 tablet (4 mg total) by mouth every 8 (eight) hours as needed (Nausea). (Patient not taking: No sig reported) 20 tablet 0    tadalafiL (CIALIS) 5 MG tablet Take 1 tablet (5 mg total) by mouth daily as needed for Erectile Dysfunction. (Patient not taking: No sig reported) 30 tablet 11    traMADoL (ULTRAM) 50 mg tablet Take 1 tablet (50 mg total) by mouth every 6 (six) hours. (Patient not taking: No sig reported) 31 tablet 0       Past Surgical History:   Procedure Laterality Date    ARTHROSCOPY OF KNEE Right 9/17/2021    Procedure: ARTHROSCOPY, KNEE ACL debridment with bone grafting;   Surgeon: Dorian Carty MD;  Location: [facility] OR;  Service: Orthopedics;  Laterality: Right;  no block    CIRCUMCISION, PRIMARY      HARDWARE REMOVAL Right 9/17/2021    Procedure: REMOVAL, HARDWARE;  Surgeon: Dorian Carty MD;  Location: [facility] OR;  Service: Orthopedics;  Laterality: Right;  no block         CBC:  No results found for: WBC, RBC, HGB, HCT, PLT, MCV, MCH, MCHC    CMP:   No results found for: NA, K, CL, CO2, BUN, CREATININE, GLU, MG, PHOS, CALCIUM, ALBUMIN, PROT, ALKPHOS, ALT, AST, BILITOT    INR:  No results found for: PT, INR, PROTIME, APTT      Diagnostic Studies:      EKG:   Results for orders placed or performed during the hospital encounter of 07/02/21   EKG 12-lead    Collection Time: 07/02/21 11:32 AM    Narrative    Test Reason : W19.XXXA,    Vent. Rate : 065 BPM     Atrial Rate : 065 BPM     P-R Int : 140 ms          QRS Dur : 090 ms      QT Int : 386 ms       P-R-T Axes : 050 072 013 degrees     QTc Int : 401 ms    Normal sinus rhythm  Nonspecific T wave abnormality  Abnormal ECG  When compared with ECG of 02-DEC-2014 00:09,  ST no longer elevated in Anterior-lateral leads  Nonspecific T wave abnormality now evident in Anterior-lateral leads  Confirmed by Serg Carter MD ([id number]) on 7/4/2021 10:21:47 AM    Referred By: [email or web address]   SELF           Confirmed By:Serg Carter MD        2D Echo:  No results found for this or any previous visit.    Stress Test:   No results found for this or any previous visit.      Pre-op Vitals   BP Pulse Resp Temp SpO2   -- -- -- -- --      Height Weight BMI (Calculated)     -- -- --         Pre-op Vitals   BP Pulse Resp Temp SpO2   -- -- -- -- --      Height Weight BMI (Calculated)     -- -- --        .    Anesthesia Evaluation          Review of Systems      Physical Exam  General:  Well nourished    Airway/Jaw/Neck:  Airway Findings: Mouth Opening: Normal Tongue: Normal  General Airway Assessment: Adult  Mallampati: I  Jaw/Neck Findings:  Neck ROM:  Normal ROM      Dental:  Dental Findings: In tact   Chest/Lungs:  Chest/Lungs Findings: Clear to auscultation     Heart/Vascular:  Heart Findings: Rate: Normal  Rhythm: Regular Rhythm  Sounds: Normal        Mental Status:  Mental Status Findings:  Cooperative         Anesthesia Plan  Type of Anesthesia, risks & benefits discussed:  Anesthesia Type:  general, regional    Patient's Preference:   Plan Factors:          Intra-op Monitoring Plan:   Intra-op Monitoring Plan Comments:   Post Op Pain Control Plan: multimodal analgesia and peripheral nerve block  Post Op Pain Control Plan Comments:     Induction:   IV  Beta Blocker:  Patient is not currently on a Beta-Blocker (No further documentation required).       Informed Consent: Patient understands risks and agrees with Anesthesia plan.  Questions answered. Anesthesia consent signed with patient.  ASA Score: 1     Day of Surgery Review of History & Physical:    H&P update referred to the surgeon.         Ready For Surgery From Anesthesia Perspective.

## 2022-01-12 NOTE — OP NOTE
DATE OF PROCEDURE: 01/12/2022    SURGEON: Dorian Carty MD    ASSISTANT:  1. Thierno Crandall MD  2. Garret Perales PA-C  3. Ata STATON    PREOPERATIVE DIAGNOSIS:    1. Right knee ACL deficiency    POSTOPERATIVE DIAGNOSIS:   1. Right knee ACL deficiency      PROCEDURE PERFORMED:   1. Right knee stage II ACL reconstruction with quadriceps tendon autograft      ANESTHESIA: General with block    BLOOD LOSS: Minimal.     IMPLANTS:  Implant Name Type Inv. Item Serial No.  Lot No. LRB No. Used Action   ACL FiberTag TightRope Implant     [employer] [id number] Right 1 Implanted   SCREW FASTTHREAD INTFR [id number] - [id number]  SCREW FASTTHREAD INTFR [id number]  [employer] [id number] Right 1 Implanted   SYS SWIVELOCK ANCH 4.75X19.1MM - [id number]  SYS SWIVELOCK ANCH 4.75X19.1MM  [employer] [id number] Right 1 Implanted         DRAINS: None.     COMPLICATIONS: None.     INTRA-OPERATIVE FINDINGS:  Exam under anesthesia revealed full passive motion equal to the contralateral knee from 0-145 degree.  He had a grade 3 Lachman's with a grade 2 pivot shift, negative posterior drawer, stable to varus and valgus stressing at 0 and 30°.  Diagnostic arthroscopy revealed intact medial lateral were discussed I covered including at the root.  There was grade 2 changes to the mediolateral femoral condyles, otherwise get take your cartilage was intact and pristine.  There was no ACL, which was to be expected.  The PCL was intact.  The bone graft of the prior tunnels was well incorporated under direct visualization.    GRAFT SIZE:  Length: 72mm  Width: 10    TUNNEL SIZE:  Femoral Length: 30mm  Femoral Diameter: 10mm  Tibial Length 50mm  Tibial Diameter: 10mm    BRIEF INDICATION OF MEDICAL NECESSITY:   Rachel Jonas is a 26 y.o. male who had previously undergone stage I revision right ACL with bone grafting of the femoral and tibial tunnels.  CT scan revealed incorporation of the bone grafting and he was not ready to proceed with  stage II, ACL reconstruction with quadriceps tendon autograft.  After discussion with the patient was determined the best course of action would be to proceed to the operating room for knee arthroscopy with ACL reconstruction using quadriceps tendon autograft.  We discussed the need for possible additional procedures such as meniscectomy versus meniscus repair, chondroplasty, and other cartilage procedures.  Procedures, as well as the risks, benefits, and alternatives, were explained to the patient in great detail all questions were answered.  Informed consent was obtained.      PROCEDURE IN DETAIL:   The patient was identified in the preoperative holding area and the site was marked.  he was taken to the operating room where there placed in the supine position on the operating room table.  Anesthetic agents were then administered.  Exam under anesthesia performed, see the detailed findings above.  A nonsterile tourniquet was then applied to the upper thigh.  The right leg was then prepped and draped in standard sterile fashion.  A time-out was undertaken around the patient, site, surgery, surgeon, and administration preoperative antibiotics.  All was agreed upon we proceeded.    We began with our quadriceps tendon graft harvest.  A 3 cm longitudinal incision was made beginning at superior pole of the patella and extending proximally for 3 cm. Sharp dissection was carried through the skin subcutaneous tissue.  Luna scissors were then used to create full-thickness flaps for retraction.  A lap sponge was then used to remove the remaining soft tissue from on top of the quadriceps tendon.  The overlying fascia was incised and cut using Luna scissors both proximally and distally.  A  was then used to ensure we had adequate tendon length greater than 70 mm.  The size 11mm quad pro was chosen for this patient.  The rim of this was marked using a marker and this was placed over the tendon to mena our graft width  at the superior pole of the patella.  This distal aspect of the tendon was then elevated off of the superior pole of the patella using a 15 blade knife and taken approximately 1-1.5 cm proximally.  A FiberLoop was then passed with several throws this distal aspect of the tendon.  The suture was passed through the quad prior harvest device.  The graft is harvested using an rotation movement of the harvester 90° clockwise and then followed back to neutral 90° counter-clockwise until we achieved appropriate graft length of 75mm.  At this point the graft was then pulled back out of the harvest device and threaded through the amputation component.  The graft was amputated.  The graft was then taken to the back table for preparation in standard fashion with suspensory fixation on the femur and interference screw fixation of the tibia.  The quadriceps tendon was then closed in interrupted fashion using 0 Vicryl sutures that were passed using a scorpion.  This was done from proximal to distal and tied sequentially.    We are now be ready to begin with our arthroscopic portion of the procedure.  Standard anterior lateral arthroscopic portal was established and diagnostic arthroscopy was performed, see the detailed findings above.  A standard anterior medial arthroscopic portal was then established under spinal needle guidance to ensure appropriate trajectory for ACL drilling.    Upon entering the notch the ACL was noted to be absent.  The bone graft was nicely incorporated the femoral and tibial tunnels.    We then examined the medial compartment.  A valgus stress was then applied to the knee and the camera was placed into the medial compartment and this compartment was examined.  The medial meniscus was thoroughly examined and noted to be intact.  The root of the medial meniscus was probed and noted to be stable.   The medial tibial plateau and medial femoral condyle were examined for cartilage deficits.  There were noted  to be changes as noted above.    Next, we proceeded to the lateral compartment.  The leg was then placed in a figure 4 position and the lateral compartment was examined.  The lateral meniscus was thoroughly examined and noted to be intact.  The root of the lateral meniscus was probed and noted to be stable.   The lateral tibial plateau and lateral femoral condyle were examined for cartilage deficits.  There were noted to be changes as noted above.    At this point we are now ready to proceed with our ACL reconstruction.  We began on the femoral side.  The soft tissue was removed from the lateral wall of the notch.  An arthroscopic bur was then used to perform a small notchplasty.  We then switched our camera to the medial portal.  This femoral FlipCutter guide was then placed the lateral portal over the lateral wall the notch near the anatomic footprint of the ACL would be.  At this point we set the guide to 105° advanced able to the skin.  A 3 cm longitudinal incision was made over the previous lateral incision.  Sharp dissection was carried through the skin subcutaneous tissue and a small incision was made in the IT band.  The bullet was then advanced to the bone.  A 3.4 mm guide pin was then advanced and exited in the appropriate location.  The bullet was then mallet it into the bone 7 mm.  The guide pin was withdrawn and the FlipCutter was then inserted.  This was drilled into the correct location.  It was opened to 10 mm and we reamed a 30 mm deep tunnel.  The FlipCutter was then removed.  A FiberStick was then placed through the bullet and into the notch.  The camera was placed back in the lateral portal and the suture was retrieved through the medial portal and secured with a hemostat for graft passage later in the procedure.  The knee was then hyperflexed and an arthroscopic shaver was used to remove the bony debris from the tunnel.  Images of the femoral tunnel were then taken.    We now turned our  attention to the tibia.  The tibial guide was placed through the medial portal onto the anatomic tibial footprint of the ACL in the bullet was advanced to the anterior medial aspect of the shin.  Once our site was marked with the bullet this was removed and a #10 blade was used to create a 4 cm longitudinal incision.  Sharp dissection was carried through skin subcutaneous tissue.  Soft tissue was removed from the anterior medial cortex of the tibia.  Once this was completed the tibial guide was then placed again.  The bullet was advanced to the anteromedial cortex of the tibia and measured a length of 50mm.  A guide pin was then advanced through the bullet and into the knee joint.  This exited in the anatomic tibial footprint of the ACL.  The tibial guide was removed.  A 10mm Reamer was then used to create our tibial tunnel.  Great care was taken minimally or breaching the cortex within the knee to ensure we did not damage the medial or lateral femoral condyle.    The shuttling suture was then retrieved through the tibial tunnel.  The graft was then passed in standard fashion transtibially.  Once the femoral button past the cortex and was flipped.  This was verified by both pulling on the graft and ensuring it did not subside back as well as under fluoroscopy.  Once this was verified we began to toggle the suture limbs to pull our graft into the femoral tunnel.  Once achieved appropriate depth we turned our attention to the tibia.  The knee was then cycled.  With appropriate tension applied to the tibial sutures the leg was extended to match the extension of the contralateral knee.  A posterior drawer force was applied.  The tibia was fixed with BioComposite interference screw, 10 x 30 mm.  This had excellent purchase.    Then performed a Lachman's which was negative.  The camera was then reinserted through the lateral portal and we visualized the graft.  This was taken through full range of motion there was noted  to be no impingement during range of motion.  The graft noted to be taut upon probing.    We then proceeded with tibial fixation back up with a 4.75 mm SwiveLock.  This was done 2 cm distal to our tibial tunnel.  After drilling with the drill, the hole was tapped and the suture limbs were loaded into the anchor.  The anchors inserted into the tapped hole and appropriately tensioned.  The anchor was then advanced with excellent purchase.    At this point we then tied our femoral button sutures.    At this point the procedure was complete and all instruments were removed.  1 g of vancomycin powder was placed within the wounds.  The quadriceps graft harvest site as well as the longitudinal tibial incision were closed with 3-0 Vicryl suture for the subcutaneous layer and running 4-0 Monocryl.  The arthroscopic portals were closed with 3-0 nylon suture in interrupted fashion.  Sterile dressings were then applied the patient was placed in a hinged knee brace locked in extension.  The patient was awakened from the anesthetic agents.  The procedure was complete without complication and tolerated well by the patient.  Was then taken to the postanesthesia care unit in stable condition    POSTOPERATIVE PLAN:  The patient will be weight-bearing as tolerated with the brace locked in extension.  He can range his knee from 0-90 degrees.  Return to clinic in 2 weeks for postoperative wound check and suture removal.

## 2022-01-12 NOTE — ANESTHESIA POSTPROCEDURE EVALUATION
Anesthesia Post Evaluation    Patient: Rachel Jonas    Procedure(s) Performed: Procedure(s) (LRB):  REVISION RECONSTRUCTION, KNEE, ACL, ARTHROSCOPIC, WITH QUAD TENDON AUTOGRAFT (Right)    Final Anesthesia Type: general      Patient location during evaluation: PACU  Patient participation: Yes- Able to Participate  Level of consciousness: awake and alert and oriented  Post-procedure vital signs: reviewed and stable  Pain management: adequate  Airway patency: patent    PONV status at discharge: No PONV  Anesthetic complications: no      Cardiovascular status: stable  Respiratory status: unassisted  Hydration status: euvolemic  Follow-up not needed.          Vitals Value Taken Time   /95 01/12/22 1527   Temp 36.7 °C (98.1 °F) 01/12/22 1526   Pulse 62 01/12/22 1526   Resp 20 01/12/22 1526   SpO2 99 % 01/12/22 1526   Vitals shown include unvalidated device data.      Event Time   Out of Recovery 15:13:00         Pain/Sherman Score: Pain Rating Prior to Med Admin: 9 (1/12/2022  2:27 PM)  Pain Rating Post Med Admin: 7 (1/12/2022  3:25 PM)  Sherman Score: 9 (1/12/2022  1:13 PM)

## 2022-01-12 NOTE — TRANSFER OF CARE
"Anesthesia Transfer of Care Note    Patient: Rachel Jonas    Procedure(s) Performed: Procedure(s) (LRB):  REVISION RECONSTRUCTION, KNEE, ACL, ARTHROSCOPIC, WITH QUAD TENDON AUTOGRAFT (Right)    Patient location: PACU    Anesthesia Type: general    Transport from OR: Transported from OR on room air with adequate spontaneous ventilation    Post pain: adequate analgesia    Post assessment: no apparent anesthetic complications    Post vital signs: stable    Level of consciousness: sedated    Nausea/Vomiting: no nausea/vomiting    Complications: none    Transfer of care protocol was followed      Last vitals:   Visit Vitals  BP (!) 143/86 (BP Location: Right arm, Patient Position: Lying)   Pulse 72   Temp 37.2 °C (99 °F) (Oral)   Resp 18   Ht 5' 7" (1.702 m)   Wt 109.8 kg (242 lb)   SpO2 100%   BMI 37.90 kg/m²     "

## 2022-01-12 NOTE — PLAN OF CARE
Spoke tot pts cousin via phone to provide pt update, verbalized understanding. States he is on his way, coming [place]

## 2022-01-12 NOTE — ANESTHESIA PROCEDURE NOTES
IPACK Single Injection    Patient location during procedure: pre-op   Block not for primary anesthetic.  Reason for block: at surgeon's request and post-op pain management   Post-op Pain Location: Right Knee and Leg pain  Start time: 1/12/2022 9:57 AM  Timeout: 1/12/2022 9:55 AM   End time: 1/12/2022 10:00 AM    Staffing  Authorizing Provider: Radha Farmer MD  Performing Provider: Lee Carlton MD    Preanesthetic Checklist  Completed: patient identified, IV checked, site marked, risks and benefits discussed, surgical consent, monitors and equipment checked, pre-op evaluation and timeout performed  Peripheral Block  Patient position: supine  Prep: ChloraPrep  Patient monitoring: heart rate, cardiac monitor, continuous pulse ox, continuous capnometry and frequent blood pressure checks  Block type: I PACK  Laterality: right  Injection technique: single shot  Needle  Needle type: Stimuplex   Needle gauge: 21 G  Needle length: 4 in  Needle localization: anatomical landmarks and ultrasound guidance   -ultrasound image captured on disc.  Assessment  Injection assessment: negative aspiration, negative parasthesia and local visualized surrounding nerve  Paresthesia pain: none  Heart rate change: no  Slow fractionated injection: yes  Pain Tolerance: comfortable throughout block and no complaints  Medications:  Medication Administration Time: 1/12/2022 9:55 AM  Medications: ropivacaine (NAROPIN) injection 0.5%, 10 mL    Medications:  Bolus administered: 20 mL of 0.25 ropivacaine  Epinephrine added: 3.75 mcg/mL (1/300,000)  Additional Notes  VSS.  [facility] RN monitoring vitals throughout procedure.  Patient tolerated procedure well.

## 2022-01-12 NOTE — DISCHARGE INSTRUCTIONS
[employer] CARE CUBE COLD THERAPY SYSTEM    The Polar Care Cube Cold Therapy System is simple and reliable. It is easy to use, compact design makes it great for home use. With the addition of ice and water, you will enjoy 6-8 hours of effortless cold therapy. Proper use requires an insulation barrier between the pad and the patient's skin.  Instructions on how to use the Polar Care Cube Cold Therapy System Below:

## 2022-01-12 NOTE — PLAN OF CARE
VSS. Pt able to tolerate oral liquids. Polar care and dressing intact. ThighTEDs intact. Pt received home meds per bedside delivery. Polar care power adapter placed with pts personal belongings. Pt instructed not to wear NANCY hose without wearing shoes/ socks due to increased risk of falls, verbalized understanding. Crutches at bedside for home use. No distress noted. D/C instructions reviewed with pt and cousin, verbalized understanding.

## 2022-01-12 NOTE — PROGRESS NOTES
Pre op completed. All concerns addressed. Patient belongings to be placed in locker. Bed in lowest position. Call light within reach. Family not available.

## 2022-01-12 NOTE — ANESTHESIA PROCEDURE NOTES
Intubation    Date/Time: 1/12/2022 10:18 AM  Performed by: Argelia Palacios CRNA  Authorized by: Radha Farmer MD     Intubation:     Induction:  Intravenous    Intubated:  Postinduction    Mask Ventilation:  Easy mask    Attempts:  1    Attempted By:  CRNA    Method of Intubation:  Direct    Blade:  Baker 2    Laryngeal View Grade: Grade I - full view of cords      Difficult Airway Encountered?: No      Complications:  None    Airway Device:  Oral endotracheal tube    Airway Device Size:  7.5    Style/Cuff Inflation:  Cuffed    Inflation Amount (mL):  6    Tube secured:  24    Secured at:  The lips    Placement Verified By:  Capnometry    Complicating Factors:  None    Findings Post-Intubation:  BS equal bilateral and atraumatic/condition of teeth unchanged

## 2022-01-12 NOTE — PLAN OF CARE
Pt continuing to c/o pain, Dr. Matthew at bedside to provide hand bolus through PNC. Cousin at bedside.

## 2022-01-12 NOTE — PLAN OF CARE
Pts BP 170s/100s, Dr. Matthew notified, No new orders placed at this time. Will continue to monitor.

## 2022-01-12 NOTE — PLAN OF CARE
PER ANESTHESIA BLADDER SCAN PERFORMED IN OR DUE TO PT HAVING PREVIOUS URINARY RETENTION POST SURGICAL PROCEDURE. KATHIE JIMENEZ AND DR. BERRY PRESENT. BLADDER SCAN VOLUME READ AT 142CC

## 2022-01-12 NOTE — BRIEF OP NOTE
[place] - Surgery (Hospital)  Brief Operative Note    Surgery Date: 1/12/2022     Surgeon(s) and Role:     * Dorian Carty MD - Primary    Assisting Surgeon: None    Pre-op Diagnosis:  Knee instability, right [M25.361]  ACL graft tear, initial encounter [T84.89XA]    Post-op Diagnosis:  Post-Op Diagnosis Codes:     * Knee instability, right [M25.361]     * ACL graft tear, initial encounter [T84.89XA]    Procedure(s) (LRB):  REVISION RECONSTRUCTION, KNEE, ACL, ARTHROSCOPIC, WITH QUAD TENDON AUTOGRAFT (Right)    Anesthesia: Regional    Operative Findings:  ACL deficiency with incorporated bone graft of the tunnels    Estimated Blood Loss: * No values recorded between 1/12/2022 10:35 AM and 1/12/2022 12:48 PM *         Specimens:   Specimen (24h ago, onward)            None            Discharge Note    OUTCOME: Patient tolerated treatment/procedure well without complication and is now ready for discharge.    DISPOSITION: Home or Self Care    FINAL DIAGNOSIS:  <principal problem not specified>    FOLLOWUP: In clinic    DISCHARGE INSTRUCTIONS:  No discharge procedures on file.

## 2022-01-12 NOTE — ANESTHESIA PROCEDURE NOTES
Adductor Canal Catheter    Patient location during procedure: pre-op   Block not for primary anesthetic.  Reason for block: at surgeon's request and post-op pain management   Post-op Pain Location: Right Knee and Leg Pain  Start time: 1/12/2022 9:50 AM  Timeout: 1/12/2022 9:45 AM   End time: 1/12/2022 10:10 AM    Staffing  Authorizing Provider: Radha Farmer MD  Performing Provider: Lee Carlton MD    Preanesthetic Checklist  Completed: patient identified, IV checked, site marked, risks and benefits discussed, surgical consent, monitors and equipment checked, pre-op evaluation and timeout performed  Peripheral Block  Patient position: supine  Prep: ChloraPrep and site prepped and draped  Patient monitoring: heart rate, cardiac monitor, continuous pulse ox, continuous capnometry and frequent blood pressure checks  Block type: adductor canal  Laterality: right  Injection technique: continuous  Needle  Needle type: Tuohy   Needle gauge: 17 G  Needle length: 3.5 in  Needle localization: anatomical landmarks and ultrasound guidance  Catheter type: spring wound  Catheter size: 19 G  Test dose: lidocaine 1.5% with Epi 1-to-200,000 and negative   -ultrasound image captured on disc.  Assessment  Injection assessment: negative aspiration, negative parasthesia and local visualized surrounding nerve  Paresthesia pain: none  Heart rate change: no  Slow fractionated injection: yes  Pain Tolerance: comfortable throughout block and no complaints  Medications:  Medication Administration Time: 1/12/2022 9:55 AM  Medications: ropivacaine (NAROPIN) injection 0.5%, 10 mL    Medications:  Bolus administered: 20 mL of 0.25 ropivacaine  Epinephrine added: 3.75 mcg/mL (1/300,000)  Additional Notes  VSS.  [facility] RN monitoring vitals throughout procedure.  Patient tolerated procedure well.

## 2022-01-13 ENCOUNTER — TELEPHONE (OUTPATIENT)
Dept: SPORTS MEDICINE | Facility: CLINIC | Age: 27
End: 2022-01-13
Payer: MEDICAID

## 2022-01-13 NOTE — ANESTHESIA POST-OP PAIN MANAGEMENT
Unable to reach Pt via phone to discuss PNC and pain control. Attempted to leave a message but was not able too.     Lee Carlton MD  [facility]

## 2022-01-13 NOTE — TELEPHONE ENCOUNTER
Patient had surgery on 1/12/22. He had questins regarding pain pump. He was instructed what it was for, and given the pain pump company's contact number for any further questions. He understood.      Ata Malhotra MS, [facility]  Sports Medicine Assistant  [facility]

## 2022-01-14 ENCOUNTER — DOCUMENTATION ONLY (OUTPATIENT)
Dept: REHABILITATION | Facility: HOSPITAL | Age: 27
End: 2022-01-14
Payer: MEDICAID

## 2022-01-14 NOTE — PROGRESS NOTES
Pt no showed his Eval scheduled on 01/14/2022 POD # 2, attempted to contact Pt via both numbers on file however no answer and unable to leave voicemail.     Will attempt to call again and get back on schedule for PT Heather Paulson, PT  01/14/2022

## 2022-01-17 NOTE — ANESTHESIA POST-OP PAIN MANAGEMENT
Unable to reach patient for [facility] follow up. Voicemail left on patient's cell number encouraging to contact anesthesia or [facility] 24[facility]7 support line for any questions or concerns about the nerve block or infusion pump. Reviewed removal process with plans to discontinue tomorrow by at 12pm and remove PNC.        Randall Mcnulty MD  Regional Anesthesiology and Acute Pain Medicine  [facility]  [employer]Link # [id number]  01/16/2022  6:28 PM

## 2022-01-20 ENCOUNTER — TELEPHONE (OUTPATIENT)
Dept: SPORTS MEDICINE | Facility: CLINIC | Age: 27
End: 2022-01-20
Payer: MEDICAID

## 2022-01-20 ENCOUNTER — CLINICAL SUPPORT (OUTPATIENT)
Dept: REHABILITATION | Facility: HOSPITAL | Age: 27
End: 2022-01-20
Attending: STUDENT IN AN ORGANIZED HEALTH CARE EDUCATION/TRAINING PROGRAM
Payer: MEDICAID

## 2022-01-20 DIAGNOSIS — Z98.890 S/P ACL RECONSTRUCTION: Primary | ICD-10-CM

## 2022-01-20 DIAGNOSIS — M62.81 MUSCLE WEAKNESS OF LOWER EXTREMITY: ICD-10-CM

## 2022-01-20 DIAGNOSIS — G89.18 ACUTE POST-OPERATIVE PAIN: ICD-10-CM

## 2022-01-20 DIAGNOSIS — M25.361 KNEE INSTABILITY, RIGHT: ICD-10-CM

## 2022-01-20 DIAGNOSIS — Z74.09 IMPAIRED FUNCTIONAL MOBILITY, BALANCE, GAIT, AND ENDURANCE: ICD-10-CM

## 2022-01-20 DIAGNOSIS — T84.89XA ACL GRAFT TEAR, INITIAL ENCOUNTER: ICD-10-CM

## 2022-01-20 DIAGNOSIS — M25.661 DECREASED RANGE OF MOTION (ROM) OF RIGHT KNEE: ICD-10-CM

## 2022-01-20 PROCEDURE — 97110 THERAPEUTIC EXERCISES: CPT | Mod: PN

## 2022-01-20 PROCEDURE — 97161 PT EVAL LOW COMPLEX 20 MIN: CPT | Mod: PN

## 2022-01-20 RX ORDER — HYDROCODONE BITARTRATE AND ACETAMINOPHEN 5; 325 MG/1; MG/1
1 TABLET ORAL EVERY 6 HOURS PRN
Qty: 21 TABLET | Refills: 0 | Status: SHIPPED | OUTPATIENT
Start: 2022-01-20 | End: 2022-01-27 | Stop reason: ALTCHOICE

## 2022-01-20 NOTE — TELEPHONE ENCOUNTER
----- Message from Cristine Palmer MA sent at 1/19/2022  5:05 PM CST -----    ----- Message -----  From: Lilia Garza  Sent: 1/19/2022   4:46 PM CST  To: Machelle JONES Staff    Type:  Patient Requesting call back    Who Called:Rachel Jonas  Would the patient rather a call back or a response via [facility]? Call back  Best Call Back Number:[phone]  Additional Information: Patient Requesting call back regarding orthopedics.

## 2022-01-20 NOTE — TELEPHONE ENCOUNTER
Spoke with pt who says the pain meds that he has now are not working for him, the oxycodone. He says he falls asleep on it but within 2 hours he is up again with pain. He wants to know if we could write him some hydrocodone which seems to work better on him. I let pt know Dr. Carty was in sx and as soon as I talked to someone, I would call him back and let him know. Pt states understanding and appreciation.
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
## 2022-01-24 ENCOUNTER — TELEPHONE (OUTPATIENT)
Dept: SPORTS MEDICINE | Facility: CLINIC | Age: 27
End: 2022-01-24
Payer: MEDICAID

## 2022-01-24 NOTE — TELEPHONE ENCOUNTER
Spoke with patient to inquire about his progress following ACL recon on 1/12/22. It was also discussed a 2 week PO visit. Time, date, and location were discussed and agreed on.      Ata Malhotra MS, [facility]  Sports Medicine Assistant  [facility]

## 2022-01-25 ENCOUNTER — CLINICAL SUPPORT (OUTPATIENT)
Dept: REHABILITATION | Facility: HOSPITAL | Age: 27
End: 2022-01-25
Attending: STUDENT IN AN ORGANIZED HEALTH CARE EDUCATION/TRAINING PROGRAM
Payer: MEDICAID

## 2022-01-25 DIAGNOSIS — M62.81 MUSCLE WEAKNESS OF LOWER EXTREMITY: ICD-10-CM

## 2022-01-25 DIAGNOSIS — Z74.09 IMPAIRED FUNCTIONAL MOBILITY, BALANCE, GAIT, AND ENDURANCE: ICD-10-CM

## 2022-01-25 DIAGNOSIS — M25.661 DECREASED RANGE OF MOTION (ROM) OF RIGHT KNEE: ICD-10-CM

## 2022-01-25 PROCEDURE — 97110 THERAPEUTIC EXERCISES: CPT | Mod: PN

## 2022-01-25 NOTE — PROGRESS NOTES
[facility]   Physical Therapy Treatment Note     Name: Rachel Jonas  [facility] Number: [id number]    Therapy Diagnosis:   Encounter Diagnoses   Name Primary?    Impaired functional mobility, balance, gait, and endurance     Muscle weakness of lower extremity     Decreased range of motion (ROM) of right knee      Physician: Dorian Carty MD    Visit Date: 1/25/2022      Physician Orders: PT Eval and Treat - Patient is scheduled to undergo the following procedure with Dorian Carty MD on 01/12/2022 RIGHT knee Anterior cruciate ligament reconstruction with quadriceps tendon autograft, possible allograft All other indicated procedures Patient will begin physical therapy on postop day 2-3.  Will fax over protocol   Medical Diagnosis from Referral: ACL graft tear- initial encounter, knee instability- right  Evaluation Date: 1/20/2022  Authorization Period Expiration: 1/10/23  Plan of Care Expiration: 9/20/22  Progress Note Due: 2/20/22  Visit # / Visits authorized: 2/?  FOTO: 2/5     Precautions: Standard      DATE OF PROCEDURE: 01/12/2022   POW: 1 weeks and 1 day(s) as of 1/20/22     SURGEON: Dorian Carty MD    PROCEDURE PERFORMED:   1. Right knee stage II ACL reconstruction with quadriceps tendon autograft     POSTOPERATIVE PLAN:  The patient will be weight-bearing as tolerated with the brace locked in extension.  He can range his knee from 0-90 degrees.  Return to clinic in 2 weeks for postoperative wound check and suture removal.      Time In: 900  Time Out: 0955  Total Appointment Time (timed & untimed codes): 45 minutes + ice (3TE medicaid)    SUBJECTIVE     Pt reports: pt reports that his knee is always hurting him. Its mostly right above his knee cap. He has been doing the exercise and working on moving his leg but it hurts. Pt reports he is driving himself to therapy.   He was compliant with home exercise program.  Response to previous treatment: last was  evaluation  Functional change: using B axillary crutches and locked knee brace     Pain: 5/10  Location: right knee      OBJECTIVE     Objective Measures updated at progress report unless specified.     Obervation: Pt ambulates into clinic c B axillary crutches and locked knee brace. PWB on R LE. Knee brace is not put on correctly.     Treatment     Bold = Performed    Rachel received the treatments listed below:      Therapeutic exercises to develop strength, endurance, ROM, flexibility, posture and core stabilization for 35 minutes including:     Long sitting hamstring and calf stretch w strap and half roll under ankle 5 x 30 sec - NT  Quad sets w NMES and half roll under ankle x 5min  SLR w min-mod A  And NMES x5min  SAQ c min A and NMES x5min  Prone knee hang (feet tied together c RTB to prevent ER) - 5min    SL hip abd w min A x 20 - NT  Ankle pumps x 20 - NT     Manual therapy techniques: for 10 minutes including:     Patellar mobs  Supine passive physiological flexion/ext c associated mobilization of tibiofemoral jt  Scar mobs  STM to R quad    Rachel received cold pack for R knee minutes to 10min.    Neuromuscular re-education activities to improve: Coordination, Kinesthetic, Sense and Proprioception for 15 minutes. The following activities were included: combined with exercises above     Guyanese NMES 5 sec on/5 sec off, 0.5 sec ramp up, co-contract, 50% duty cycle, burst freq 50 bps x 10 min      Patient Education and Home Exercises     Home Exercises Provided and Patient Education Provided     Education provided:   - Pt educated on POC  - Pt educated on anatomy and physiology of current conditions as it relates to signs and symptoms  - Pt educated on HEP     Written Home Exercises Provided: Patient instructed to cont prior HEP. Exercises were reviewed and Rachel was able to demonstrate them prior to the end of the session.  Rachel demonstrated fair  understanding of the education provided. See EMR under  Patient Instructions for exercises provided during therapy sessions    ASSESSMENT     Pt presents to PT today for first appt after evaluation. Pt continues to present c limitation into knee ext and flexion. Pt was able to attain full knee EXT by end of tx, however pt sleeps with hip in ER causing bend in knee. Pt provided c RTB to tie around feet to assist with reducing this in order to keep knee straight during sleeping. Pt had a poor fitting brace, and was taught how to properly put it on today. Brace was placed into 10' hyperextension to assist with terminal knee extension he was lacking at beginning of tx today. Pt struggles with quad contraction and both physical, verbal, and NMES were required in order to contract properly.     Rachel Is progressing well towards his goals.   Pt prognosis is Fair.     Pt will continue to benefit from skilled outpatient physical therapy to address the deficits listed in the problem list box on initial evaluation, provide pt/family education and to maximize pt's level of independence in the home and community environment.     Pt's spiritual, cultural and educational needs considered and pt agreeable to plan of care and goals.     Anticipated barriers to physical therapy: chronicity of condition, compliance    Goals:   GOALS: Short Term Goals:  16 weeks in progress  1. Report decreased R knee pain  <  / =  5/10 at worst to increase tolerance for prolonged standing.  2. Pt will be able to tolerate multi-directional LE strengthening in order to improve ability to perform age appropriate activities.  3. Pt will increase R knee extension AROM to 0 degrees to indicate improved gait pattern.   4. Pt will report 50% improvement in ability to walk long distances since start of care to indicate improved functional mobility.   5. Pt to tolerate HEP to improve ROM and independence with ADL's.     Long Term Goals: 32 weeks in progress  1. Report decreased R knee pain  <  / =  3/10 at worst to  increase tolerance for prolonged standing.  2. Pt will be able to perform 2 x 10 multi-directional LE strengthening without fatigue and no extensor lag in order to improve ability to perform age appropriate activities.  3. Pt will increase R knee flexion AROM to 130 degrees to indicate improved gait pattern.   4. Pt will report 80% improvement in ability to walk long distances since start of care to indicate improved functional mobility.  5. Pt to be Independent with HEP to improve ROM and independence with ADL's.  6. Pt will be able to demonstrate good dynamic R knee stability without valgus collapse to return to work.     PLAN     Continue with ACL protocol - focus on quad strength    Ann Flor, PT, DPT, OCS, Cert. DN

## 2022-01-27 ENCOUNTER — TELEPHONE (OUTPATIENT)
Dept: SPORTS MEDICINE | Facility: CLINIC | Age: 27
End: 2022-01-27
Payer: MEDICAID

## 2022-01-27 ENCOUNTER — OFFICE VISIT (OUTPATIENT)
Dept: SPORTS MEDICINE | Facility: CLINIC | Age: 27
End: 2022-01-27
Payer: MEDICAID

## 2022-01-27 ENCOUNTER — HOSPITAL ENCOUNTER (OUTPATIENT)
Dept: RADIOLOGY | Facility: HOSPITAL | Age: 27
Discharge: HOME | End: 2022-01-27
Attending: ORTHOPAEDIC SURGERY
Payer: MEDICAID

## 2022-01-27 VITALS
SYSTOLIC BLOOD PRESSURE: 154 MMHG | WEIGHT: 233 LBS | HEART RATE: 83 BPM | DIASTOLIC BLOOD PRESSURE: 91 MMHG | BODY MASS INDEX: 36.57 KG/M2 | HEIGHT: 67 IN

## 2022-01-27 DIAGNOSIS — G89.18 POST-OP PAIN: ICD-10-CM

## 2022-01-27 DIAGNOSIS — Z98.890 S/P ACL RECONSTRUCTION: Primary | ICD-10-CM

## 2022-01-27 DIAGNOSIS — Z98.890 S/P ACL RECONSTRUCTION: ICD-10-CM

## 2022-01-27 PROCEDURE — 99024 POSTOP FOLLOW-UP VISIT: CPT | Mod: S$PBB,,, | Performed by: ORTHOPAEDIC SURGERY

## 2022-01-27 PROCEDURE — 20610 LARGE JOINT ASPIRATION/INJECTION: R KNEE: ICD-10-PCS | Mod: S$PBB,RT,, | Performed by: ORTHOPAEDIC SURGERY

## 2022-01-27 PROCEDURE — 99999 PR PBB SHADOW E&M-EST. PATIENT-LVL IV: CPT | Mod: PBBFAC,,, | Performed by: ORTHOPAEDIC SURGERY

## 2022-01-27 PROCEDURE — 99214 OFFICE O/P EST MOD 30 MIN: CPT | Mod: PBBFAC,25 | Performed by: ORTHOPAEDIC SURGERY

## 2022-01-27 PROCEDURE — 1160F PR REVIEW ALL MEDS BY PRESCRIBER/CLIN PHARMACIST DOCUMENTED: ICD-10-PCS | Mod: CPTII,,, | Performed by: ORTHOPAEDIC SURGERY

## 2022-01-27 PROCEDURE — 3080F PR MOST RECENT DIASTOLIC BLOOD PRESSURE >= 90 MM HG: ICD-10-PCS | Mod: CPTII,,, | Performed by: ORTHOPAEDIC SURGERY

## 2022-01-27 PROCEDURE — 3077F PR MOST RECENT SYSTOLIC BLOOD PRESSURE >= 140 MM HG: ICD-10-PCS | Mod: CPTII,,, | Performed by: ORTHOPAEDIC SURGERY

## 2022-01-27 PROCEDURE — 20610 DRAIN/INJ JOINT/BURSA W/O US: CPT | Mod: S$PBB,RT,, | Performed by: ORTHOPAEDIC SURGERY

## 2022-01-27 PROCEDURE — 1159F MED LIST DOCD IN RCRD: CPT | Mod: CPTII,,, | Performed by: ORTHOPAEDIC SURGERY

## 2022-01-27 PROCEDURE — 1160F RVW MEDS BY RX/DR IN RCRD: CPT | Mod: CPTII,,, | Performed by: ORTHOPAEDIC SURGERY

## 2022-01-27 PROCEDURE — 3008F PR BODY MASS INDEX (BMI) DOCUMENTED: ICD-10-PCS | Mod: CPTII,,, | Performed by: ORTHOPAEDIC SURGERY

## 2022-01-27 PROCEDURE — 3077F SYST BP >= 140 MM HG: CPT | Mod: CPTII,,, | Performed by: ORTHOPAEDIC SURGERY

## 2022-01-27 PROCEDURE — 99024 PR POST-OP FOLLOW-UP VISIT: ICD-10-PCS | Mod: S$PBB,,, | Performed by: ORTHOPAEDIC SURGERY

## 2022-01-27 PROCEDURE — 3080F DIAST BP >= 90 MM HG: CPT | Mod: CPTII,,, | Performed by: ORTHOPAEDIC SURGERY

## 2022-01-27 PROCEDURE — 20610 DRAIN/INJ JOINT/BURSA W/O US: CPT | Mod: PBBFAC,RT | Performed by: ORTHOPAEDIC SURGERY

## 2022-01-27 PROCEDURE — 99999 PR PBB SHADOW E&M-EST. PATIENT-LVL IV: ICD-10-PCS | Mod: PBBFAC,,, | Performed by: ORTHOPAEDIC SURGERY

## 2022-01-27 PROCEDURE — 1159F PR MEDICATION LIST DOCUMENTED IN MEDICAL RECORD: ICD-10-PCS | Mod: CPTII,,, | Performed by: ORTHOPAEDIC SURGERY

## 2022-01-27 PROCEDURE — 3008F BODY MASS INDEX DOCD: CPT | Mod: CPTII,,, | Performed by: ORTHOPAEDIC SURGERY

## 2022-01-27 RX ORDER — HYDROCODONE BITARTRATE AND ACETAMINOPHEN 5; 325 MG/1; MG/1
1 TABLET ORAL EVERY 6 HOURS PRN
Qty: 21 TABLET | Refills: 0 | OUTPATIENT
Start: 2022-01-27 | End: 2023-07-03

## 2022-01-27 NOTE — PROCEDURES
Large Joint Aspiration/Injection: R knee    Date/Time: 1/27/2022 10:30 AM  Performed by: Garret Perales PA-C  Authorized by: Garret Perales PA-C     Consent Done?:  Yes (Verbal)  Indications:  Pain and joint swelling  Site marked: the procedure site was marked    Timeout: prior to procedure the correct patient, procedure, and site was verified    Prep: patient was prepped and draped in usual sterile fashion      Local anesthesia used?: Yes    Local anesthetic:  Lidocaine spray    Details:  Needle Size:  22 G and 18 G  Approach:  Superior  Location:  Knee  Site:  R knee  Aspirate amount (mL):  0  Patient tolerance:  Patient tolerated the procedure well with no immediate complications

## 2022-01-27 NOTE — PROGRESS NOTES
Subjective:          Chief Complaint: Rachel Jonas is a 26 y.o. male who had concerns including Post-op Evaluation of the Right Knee.    HPI    Patient is here s/p Right ACL recon for 2 week post-op appointment. He reports 8/10 pain and is taking taking oxycodone twice a day for pain.  Patient states he is running low on this medication, and is requesting a refill He denies any nausea, vomiting, fever, chills, shortness of breath, or calf pain. He is attending formal physical therapy at [facility].  T scope knee brace in place    Procedure(s) (LRB) with Dorian Carty MD on 01/12/2022:  REVISION RECONSTRUCTION, KNEE, ACL, ARTHROSCOPIC, WITH QUAD TENDON AUTOGRAFT (Right)    Review of Systems   Constitutional: Negative.   HENT: Negative.    Eyes: Negative.    Cardiovascular: Negative.    Respiratory: Negative.    Endocrine: Negative.    Hematologic/Lymphatic: Negative.    Skin: Negative.    Musculoskeletal: Positive for joint pain and joint swelling.   Neurological: Negative.    Psychiatric/Behavioral: Negative.    Allergic/Immunologic: Negative.                    Objective:        General: Rachel is well-developed, well-nourished, appears stated age, in no acute distress, alert and oriented to time, place and person.     General    Constitutional: He is oriented to person, place, and time. He appears well-developed and well-nourished. No distress.   Cardiovascular: Normal rate and regular rhythm.    Neurological: He is alert and oriented to person, place, and time.   Psychiatric: He has a normal mood and affect. His behavior is normal. Thought content normal.           Right Knee Exam     Inspection   Erythema: absent  Scars: present  Swelling: present  Effusion: absent  Deformity: absent  Bruising: absent    Range of Motion   Flexion: 100     Other   Sensation: normal    Vascular Exam     Right Pulses  Dorsalis Pedis:      2+  Posterior Tibial:      2+                     Assessment:       Encounter Diagnosis   Name Primary?    S/P ACL reconstruction Yes          Plan:       1. Patient instructed to continue to utilize hinged T-scope knee brace until we see him again for 6 week post-op appt. Must be locked in extension during ambulation, but can bend the knee from 0-90 degrees at other times. May progress to 50% WBAT.     2. Suture tags removed and Steri-Strips placed. Patient may now shower without covering incision site. Instructed not to submerge incision site in water for another 2 weeks    3. Continue daily ASA and Celebrex. Prescription given for Norco prn pain to take no more than 3 times a day.    4. Continue PT at [facility] per protocol.     5. RTC to see Dorian Carty MD in 4 weeks for 6 week post-op evaluation. Will obtain X-rays to assess bone growth and graft placement.       All of the patient's questions were answered. Patient was advised to call the clinic or contact me through the patient portal for any questions or concerns.                   Patient questionnaires may have been collected.

## 2022-01-27 NOTE — TELEPHONE ENCOUNTER
----- Message from Laith Zhao sent at 1/27/2022 10:08 AM CST -----  Regarding: appt  Contact: pt  Pt requesting a call back in regards to appt scheduled has arrived but on crutches and having trouble finding office.      Pt @ [phone]

## 2022-02-03 ENCOUNTER — CLINICAL SUPPORT (OUTPATIENT)
Dept: REHABILITATION | Facility: HOSPITAL | Age: 27
End: 2022-02-03
Attending: STUDENT IN AN ORGANIZED HEALTH CARE EDUCATION/TRAINING PROGRAM
Payer: MEDICAID

## 2022-02-03 DIAGNOSIS — M25.661 DECREASED RANGE OF MOTION (ROM) OF RIGHT KNEE: ICD-10-CM

## 2022-02-03 DIAGNOSIS — M62.81 MUSCLE WEAKNESS OF LOWER EXTREMITY: ICD-10-CM

## 2022-02-03 DIAGNOSIS — Z74.09 IMPAIRED FUNCTIONAL MOBILITY, BALANCE, GAIT, AND ENDURANCE: ICD-10-CM

## 2022-02-03 PROCEDURE — 97110 THERAPEUTIC EXERCISES: CPT | Mod: PN

## 2022-02-03 NOTE — PROGRESS NOTES
[facility]   Physical Therapy Treatment Note     [id number]    Therapy Diagnosis:   Encounter Diagnoses   Name Primary?    Impaired functional mobility, balance, gait, and endurance     Muscle weakness of lower extremity     Decreased range of motion (ROM) of right knee      Physician: Dorian Carty MD    Visit Date: 2/3/2022      Physician Orders: PT Eval and Treat - Patient is scheduled to undergo the following procedure with Dorian Carty MD on 01/12/2022 RIGHT knee Anterior cruciate ligament reconstruction with quadriceps tendon autograft, possible allograft All other indicated procedures Patient will begin physical therapy on postop day 2-3.  Will fax over protocol   Medical Diagnosis from Referral: ACL graft tear- initial encounter, knee instability- right  Evaluation Date: 1/20/2022  Authorization Period Expiration: 1/10/23  Plan of Care Expiration: 9/20/22  Progress Note Due: 2/20/22  Visit # / Visits authorized:  pending 3/?  FOTO: 2/5     Precautions: Standard      DATE OF PROCEDURE: 01/12/2022   POW: 3 weeks and 1 day(s) as of 2/1/22     SURGEON: Dorian Carty MD    PROCEDURE PERFORMED:   1. Right knee stage II ACL reconstruction with quadriceps tendon autograft     POSTOPERATIVE PLAN:  The patient will be weight-bearing as tolerated with the brace locked in extension.  He can range his knee from 0-90 degrees.  Return to clinic in 2 weeks for postoperative wound check and suture removal.      Time In: 9:05 am  Time Out:10:00 am  Total Appointment Time (timed & untimed codes): 55 minutes    SUBJECTIVE     Pt reports: that he is not wearing crutches today because the weather is bad outside (raining), Pt states he does not trust himself with crutches because he had fall few weeks ago. Pt states he cannot make to therapy after 1 pm because he is using somebody else car. Pt reports that he lives in [place]. Pt reports some  anterior knee pain.   He was compliant with home exercise program.  Response to previous treatment: last was evaluation  Functional change: using B axillary crutches and locked knee brace     Pain: 5/10  Location: right knee      OBJECTIVE     Objective Measures updated at progress report unless specified.     Obervation: Pt ambulates into clinic c B axillary crutches and locked knee brace. PWB on R LE. Knee brace is not put on correctly.     Treatment     Bold = Performed    Radhames received the treatments listed below:      Therapeutic exercises to develop strength, endurance, ROM, flexibility, posture and core stabilization for 45 minutes including:     Nustep 6 min 90 deg flexion   Long sitting hamstring and calf stretch w strap and half roll under ankle 5 x 30 sec -  Quad sets w NMES and half roll under ankle x 5min  SLR w min-mod A  And NMES x5min  SAQ 20x  Hold 3 sec  Quad set with heel lift 10x hold 1 sec   Prone knee hang (feet tied together c RTB to prevent ER) - 5min  Pt education    Manual therapy techniques: for 10 minutes including:     Patellar mobs  Supine passive physiological flexion/ext c associated mobilization of tibiofemoral jt  Scar mobs  STM to R quad    Radhames received cold pack for R knee minutes to 10min.    Neuromuscular re-education activities to improve: Coordination, Kinesthetic, Sense and Proprioception for 15 minutes. The following activities were included: combined with exercises above     [place]ir[place] NMES 5 sec on/5 sec off, 0.5 sec ramp up, co-contract, 50% duty cycle, burst freq 50 bps x 10 min      Patient Education and Home Exercises     Home Exercises Provided and Patient Education Provided     Education provided:   - Pt educated on POC  - Pt educated on anatomy and physiology of current conditions as it relates to signs and symptoms  - Pt educated on HEP     Written Home Exercises Provided: Patient instructed to cont prior HEP. Exercises were reviewed and Radhames was able to  demonstrate them prior to the end of the session.  Radhames demonstrated fair  understanding of the education provided. See EMR under Patient Instructions for exercises provided during therapy sessions    ASSESSMENT     Pt tolerated PT session good. PT is 3 wee and 1 day s/p Right knee stage II ACL reconstruction with quadriceps tendon autograft. Pt presented to therapy with T-scope and full WB. Pt left crutches at home. He states he has to come down 2 flight of stairs. He was scared because it was raining. He states he is scare to fall from crutches again. Pt is non-compliant with protocol precarious. Weight bearing status was given today and practice, but pt still walk out therapy full weightbearing. PT teach pt how to set up brace in full extension. Pt cont with weakness of quad muscles, NMES was performed  to improve quad contraction. Pt demonstrated tightness of hamstring and calf muscles. Good patella mobility today. Pt required close supervision to perform exercises with proper form and proper guide line to protocol. Pt cont to lack R knee extension and flexion AROM. Plan to cont skilled PT services according to protocol.     Radhames Is progressing well towards his goals.   Pt prognosis is Fair.     Pt will continue to benefit from skilled outpatient physical therapy to address the deficits listed in the problem list box on initial evaluation, provide pt/family education and to maximize pt's level of independence in the home and community environment.     Pt's spiritual, cultural and educational needs considered and pt agreeable to plan of care and goals.     Anticipated barriers to physical therapy: chronicity of condition, compliance    Goals:   GOALS: Short Term Goals:  16 weeks in progress  1. Report decreased R knee pain  <  / =  5/10 at worst to increase tolerance for prolonged standing.  2. Pt will be able to tolerate multi-directional LE strengthening in order to improve ability to perform age appropriate  activities.  3. Pt will increase R knee extension AROM to 0 degrees to indicate improved gait pattern.   4. Pt will report 50% improvement in ability to walk long distances since start of care to indicate improved functional mobility.   5. Pt to tolerate HEP to improve ROM and independence with ADL's.     Long Term Goals: 32 weeks in progress  1. Report decreased R knee pain  <  / =  3/10 at worst to increase tolerance for prolonged standing.  2. Pt will be able to perform 2 x 10 multi-directional LE strengthening without fatigue and no extensor lag in order to improve ability to perform age appropriate activities.  3. Pt will increase R knee flexion AROM to 130 degrees to indicate improved gait pattern.   4. Pt will report 80% improvement in ability to walk long distances since start of care to indicate improved functional mobility.  5. Pt to be Independent with HEP to improve ROM and independence with ADL's.  6. Pt will be able to demonstrate good dynamic R knee stability without valgus collapse to return to work.     PLAN     Continue with ACL protocol - focus on quad strength    Reji Howell, PT, DPT, OCS, Cert. DN

## 2022-02-04 ENCOUNTER — CLINICAL SUPPORT (OUTPATIENT)
Dept: REHABILITATION | Facility: HOSPITAL | Age: 27
End: 2022-02-04
Attending: STUDENT IN AN ORGANIZED HEALTH CARE EDUCATION/TRAINING PROGRAM
Payer: MEDICAID

## 2022-02-04 DIAGNOSIS — Z74.09 IMPAIRED FUNCTIONAL MOBILITY, BALANCE, GAIT, AND ENDURANCE: ICD-10-CM

## 2022-02-04 DIAGNOSIS — M25.661 DECREASED RANGE OF MOTION (ROM) OF RIGHT KNEE: ICD-10-CM

## 2022-02-04 DIAGNOSIS — M62.81 MUSCLE WEAKNESS OF LOWER EXTREMITY: ICD-10-CM

## 2022-02-04 PROCEDURE — 97110 THERAPEUTIC EXERCISES: CPT | Mod: PN

## 2022-02-04 NOTE — PROGRESS NOTES
[facility]   Physical Therapy Treatment Note     [id number]    Therapy Diagnosis:   Encounter Diagnoses   Name Primary?    Impaired functional mobility, balance, gait, and endurance     Muscle weakness of lower extremity     Decreased range of motion (ROM) of right knee      Physician: Dorian Carty MD    Visit Date: 2/4/2022      Physician Orders: PT Eval and Treat - Patient is scheduled to undergo the following procedure with Dorian Carty MD on 01/12/2022 RIGHT knee Anterior cruciate ligament reconstruction with quadriceps tendon autograft, possible allograft All other indicated procedures Patient will begin physical therapy on postop day 2-3.  Will fax over protocol   Medical Diagnosis from Referral: ACL graft tear- initial encounter, knee instability- right  Evaluation Date: 1/20/2022  Authorization Period Expiration: 1/10/23  Plan of Care Expiration: 9/20/22  Progress Note Due: 2/20/22  Visit # / Visits authorized:  pending 3/?  FOTO: 2/5     Precautions: Standard      DATE OF PROCEDURE: 01/12/2022   POW: 3 weeks and 2 day(s) as of 2/4/22     SURGEON: Dorian Carty MD    PROCEDURE PERFORMED:   1. Right knee stage II ACL reconstruction with quadriceps tendon autograft     POSTOPERATIVE PLAN:  The patient will be weight-bearing as tolerated with the brace locked in extension.  He can range his knee from 0-90 degrees.  Return to clinic in 2 weeks for postoperative wound check and suture removal.      Time In: 9:30 am  Time Out:10:45  am  Total Appointment Time (timed & untimed codes): 1 hour and 15 minutes    SUBJECTIVE     Pt reports: that he fell yesterday at his stairs. He states he was using B crutches but he lost balance and fell down with his R knee straight. He was wearign hinged brace locked in position. He states he did not hit his knee he only hurt his hand. R knee pain today is 5/10   He was compliant with home exercise  program.  Response to previous treatment: last was evaluation  Functional change: using B axillary crutches and locked knee brace     Pain: 5/10  Location: right knee      OBJECTIVE     Objective Measures updated at progress report unless specified.     Obervation: Pt ambulates into clinic c B axillary crutches and locked knee brace. PWB on R LE. Knee brace is not put on correctly.     Treatment     Updated 2-4-22  R knee flexion: 90 deg AAROM  R knee extension: lacking -6 deg AROM    Bold = Performed    Radhames received the treatments listed below:      Therapeutic exercises to develop strength, endurance, ROM, flexibility, posture and core stabilization for 55 minutes including:     Nustep 6 min 90 deg flexion   Long sitting hamstring and calf stretch w strap and half roll under ankle 5 x 30 sec -  Quad sets w NMES and half roll under ankle x 10min  SLR w min-mod A  And NMES x5min  SAQ 20x  Hold 3 sec  Quad set with heel lift 10x hold 1 sec (30x indep with PT)  +SLR in brace 3x10 with PT cueing  Prone knee hang (feet tied together c RTB to prevent ER) - 5min  Pt education      Manual therapy techniques: for 30 minutes including:     Patellar mobs  Supine passive physiological flexion/ext c associated mobilization of tibiofemoral jt  Scar mobs  STM to R quad  +Screw home mechanism with extension joint mob  +Hyperext mob    Radhames received cold pack for R knee minutes to 10min.    Neuromuscular re-education activities to improve: Coordination, Kinesthetic, Sense and Proprioception for 15 minutes. The following activities were included: combined with exercises above     Portuguese NMES 5 sec on/5 sec off, 0.5 sec ramp up, co-contract, 50% duty cycle, burst freq 50 bps x 10 min      Patient Education and Home Exercises     Home Exercises Provided and Patient Education Provided     Education provided:   - Pt educated on POC  - Pt educated on anatomy and physiology of current conditions as it relates to signs and symptoms  -  Pt educated on HEP     Written Home Exercises Provided: Patient instructed to cont prior HEP. Exercises were reviewed and Radhames was able to demonstrate them prior to the end of the session.  Radhames demonstrated fair  understanding of the education provided. See EMR under Patient Instructions for exercises provided during therapy sessions    ASSESSMENT     Pt tolerated PT session good. PT is 3 wee and 2 day s/p Right knee stage II ACL reconstruction with quadriceps tendon autograft. Pt presented to therapy with T-scope and full WB and using single crutches.  Pt is non-compliant with protocol. Educated pt how to wear brace with proper form. Pt cont with weakness of quad muscles, NMES was performed  to improve quad contraction. Pt demonstrated tightness of hamstring and calf muscles.Pt was able to achieve 90 deg of R knee flexion AAROM, pt is lacking -6 deg of L knee extension. Pt required close supervision to perform exercises with proper form and proper guide line to protocol. Pt cont to lack R knee extension and flexion AROM. Plan to cont skilled PT services according to protocol.     Jonit mobs performed by PT for improve knee extension including screw home mechanism with extension mob into end-range. Indep quad strengthening continued with PT,with emphasis on heel clearance on mat and maintaining TKE during SLR to prevent extensor lag within brace.    Radhames Is progressing well towards his goals.   Pt prognosis is Fair.     Pt will continue to benefit from skilled outpatient physical therapy to address the deficits listed in the problem list box on initial evaluation, provide pt/family education and to maximize pt's level of independence in the home and community environment.     Pt's spiritual, cultural and educational needs considered and pt agreeable to plan of care and goals.     Anticipated barriers to physical therapy: chronicity of condition, compliance    Goals:   GOALS: Short Term Goals:  16 weeks in  progress  1. Report decreased R knee pain  <  / =  5/10 at worst to increase tolerance for prolonged standing.  2. Pt will be able to tolerate multi-directional LE strengthening in order to improve ability to perform age appropriate activities.  3. Pt will increase R knee extension AROM to 0 degrees to indicate improved gait pattern.   4. Pt will report 50% improvement in ability to walk long distances since start of care to indicate improved functional mobility.   5. Pt to tolerate HEP to improve ROM and independence with ADL's.     Long Term Goals: 32 weeks in progress  1. Report decreased R knee pain  <  / =  3/10 at worst to increase tolerance for prolonged standing.  2. Pt will be able to perform 2 x 10 multi-directional LE strengthening without fatigue and no extensor lag in order to improve ability to perform age appropriate activities.  3. Pt will increase R knee flexion AROM to 130 degrees to indicate improved gait pattern.   4. Pt will report 80% improvement in ability to walk long distances since start of care to indicate improved functional mobility.  5. Pt to be Independent with HEP to improve ROM and independence with ADL's.  6. Pt will be able to demonstrate good dynamic R knee stability without valgus collapse to return to work.     PLAN     Continue with ACL protocol - focus on quad strength    Reji Howell, PT   Dino Paulson, PT

## 2022-02-07 ENCOUNTER — CLINICAL SUPPORT (OUTPATIENT)
Dept: REHABILITATION | Facility: HOSPITAL | Age: 27
End: 2022-02-07
Attending: STUDENT IN AN ORGANIZED HEALTH CARE EDUCATION/TRAINING PROGRAM
Payer: MEDICAID

## 2022-02-07 DIAGNOSIS — M62.81 MUSCLE WEAKNESS OF LOWER EXTREMITY: ICD-10-CM

## 2022-02-07 DIAGNOSIS — Z74.09 IMPAIRED FUNCTIONAL MOBILITY, BALANCE, GAIT, AND ENDURANCE: ICD-10-CM

## 2022-02-07 DIAGNOSIS — M25.661 DECREASED RANGE OF MOTION (ROM) OF RIGHT KNEE: ICD-10-CM

## 2022-02-07 PROCEDURE — 97110 THERAPEUTIC EXERCISES: CPT | Mod: PN

## 2022-02-07 NOTE — PROGRESS NOTES
[facility]   Physical Therapy Treatment Note     [id number]    Therapy Diagnosis:   Encounter Diagnoses   Name Primary?    Impaired functional mobility, balance, gait, and endurance     Muscle weakness of lower extremity     Decreased range of motion (ROM) of right knee      Physician: Dorian Carty MD    Visit Date: 2/7/2022      Physician Orders: PT Eval and Treat - Patient is scheduled to undergo the following procedure with Dorian Carty MD on 01/12/2022 RIGHT knee Anterior cruciate ligament reconstruction with quadriceps tendon autograft, possible allograft All other indicated procedures Patient will begin physical therapy on postop day 2-3.  Will fax over protocol   Medical Diagnosis from Referral: ACL graft tear- initial encounter, knee instability- right  Evaluation Date: 1/20/2022  Authorization Period Expiration: 03/04/2022  Plan of Care Expiration: 9/20/22  Progress Note Due: 2/20/22  Visit # / Visits authorized:  5/12  FOTO: 2/5     Precautions: Standard      DATE OF PROCEDURE: 01/12/2022   POW: 3 weeks and 5 day(s) as of 2/7/22     SURGEON: Dorian Carty MD    PROCEDURE PERFORMED:   1. Right knee stage II ACL reconstruction with quadriceps tendon autograft     POSTOPERATIVE PLAN:  The patient will be weight-bearing as tolerated with the brace locked in extension.  He can range his knee from 0-90 degrees.  Return to clinic in 2 weeks for postoperative wound check and suture removal.      Time In: 1:30 pm  Time Out:2:15 pm  Total Appointment Time (timed & untimed codes): 45 min    SUBJECTIVE     Pt reports: that he did not have any fall. Pt states pain level is not too bad. Pain only increases when he goes outside and it is cold. Pain is average  2-3/10.   He was compliant with home exercise program.  Response to previous treatment: last was evaluation  Functional change: using B axillary crutches and locked knee brace      Pain: 2/10  Location: right knee      OBJECTIVE     Objective Measures updated at progress report unless specified.     Obervation: Pt ambulates into clinic c S axillary crutches and locked knee brace. WBAT on R LE.     Treatment     Updated 2-4-22  R knee flexion: 90 deg AAROM  R knee extension: lacking -6 deg AROM    Bold = Performed    Radhames received the treatments listed below:      Therapeutic exercises to develop strength, endurance, ROM, flexibility, posture and core stabilization for 40 minutes including:     Nustep 6 min 90 deg flexion   Standing calf stretch 3x30 sec   Min squat with brace 3x10  SLR in brace 3x10 with PT cueing  SL hip abd w/ brace   SL hip add w/ brace  Prone hip extension w/ brace  Quad sets w NMES and half roll under ankle 3x10   SAQ 20x  Hold 3 sec  Quad set with heel lift 10x hold 1 sec (30x indep with PT)  Heel slide AAROM NP due to time  Heel props 5# 3min         Manual therapy techniques: for 5 minutes including:     Patellar mobs  Supine passive physiological flexion/ext c associated mobilization of tibiofemoral jt  Scar mobs  STM to R quad  +Screw home mechanism with extension joint mob  +Hyperext mob    Radhames received cold pack for R knee minutes to 10min.    Neuromuscular re-education activities to improve: Coordination, Kinesthetic, Sense and Proprioception for 15 minutes. The following activities were included: combined with exercises above     [place] NMES 5 sec on/5 sec off, 0.5 sec ramp up, co-contract, 50% duty cycle, burst freq 50 bps x 10 min      Patient Education and Home Exercises     Home Exercises Provided and Patient Education Provided     Education provided:   - Pt educated on POC  - Pt educated on anatomy and physiology of current conditions as it relates to signs and symptoms  - Pt educated on HEP     Written Home Exercises Provided: Patient instructed to cont prior HEP. Exercises were reviewed and Radhames was able to demonstrate them prior to the end of the  session.  Radhames demonstrated fair  understanding of the education provided. See EMR under Patient Instructions for exercises provided during therapy sessions    ASSESSMENT     Pt tolerated PT session good. PT is 3 wee and 4 day s/p Right knee stage II ACL reconstruction with quadriceps tendon autograft. Pt presented to therapy with T-scope and full WB and using single crutches.  Pt is non-compliant with protocol. Pt cont with weakness of quad muscles, NMES was performed  to improve quad contraction. Pt demonstrated tightness of hamstring and calf muscles. Progression of exercises to standing calf stretch and standing mini- squat. Hip 4 ways was performed with brace. Increase muscles fasciculation of quad and hip muscles. Pt wants to go to gym for work out. PT educated pt to only perform therapy exercises at home or gym.  Pt required close supervision to perform exercises with proper form and proper guide line to protocol. Pt cont to lack R knee extension and flexion AROM. Plan to cont skilled PT services according to protocol.       Radhames Is progressing well towards his goals.   Pt prognosis is Fair.     Pt will continue to benefit from skilled outpatient physical therapy to address the deficits listed in the problem list box on initial evaluation, provide pt/family education and to maximize pt's level of independence in the home and community environment.     Pt's spiritual, cultural and educational needs considered and pt agreeable to plan of care and goals.     Anticipated barriers to physical therapy: chronicity of condition, compliance    Goals:   GOALS: Short Term Goals:  16 weeks in progress  1. Report decreased R knee pain  <  / =  5/10 at worst to increase tolerance for prolonged standing.  2. Pt will be able to tolerate multi-directional LE strengthening in order to improve ability to perform age appropriate activities.  3. Pt will increase R knee extension AROM to 0 degrees to indicate improved gait  pattern.   4. Pt will report 50% improvement in ability to walk long distances since start of care to indicate improved functional mobility.   5. Pt to tolerate HEP to improve ROM and independence with ADL's.     Long Term Goals: 32 weeks in progress  1. Report decreased R knee pain  <  / =  3/10 at worst to increase tolerance for prolonged standing.  2. Pt will be able to perform 2 x 10 multi-directional LE strengthening without fatigue and no extensor lag in order to improve ability to perform age appropriate activities.  3. Pt will increase R knee flexion AROM to 130 degrees to indicate improved gait pattern.   4. Pt will report 80% improvement in ability to walk long distances since start of care to indicate improved functional mobility.  5. Pt to be Independent with HEP to improve ROM and independence with ADL's.  6. Pt will be able to demonstrate good dynamic R knee stability without valgus collapse to return to work.     PLAN     Continue with ACL protocol - focus on quad strength      Reji Howell, PT

## 2022-02-08 ENCOUNTER — DOCUMENTATION ONLY (OUTPATIENT)
Dept: REHABILITATION | Facility: HOSPITAL | Age: 27
End: 2022-02-08
Payer: MEDICAID

## 2022-02-08 NOTE — PROGRESS NOTES
Pt missed their scheduled appt today. Attempted to contact pt via phone and unable to speak to pt. Left voicemail informing that we can re-schedule visit for later time.     Reji Arango, PT, DPT

## 2022-02-11 NOTE — PROGRESS NOTES
[facility]   Physical Therapy Treatment Note     [id number]    Therapy Diagnosis:   Encounter Diagnoses   Name Primary?    Impaired functional mobility, balance, gait, and endurance Yes    Muscle weakness of lower extremity     Decreased range of motion (ROM) of right knee      Physician: Dorian Carty MD    Visit Date: 2/14/2022      Physician Orders: PT Eval and Treat - Patient is scheduled to undergo the following procedure with Dorian Carty MD on 01/12/2022 RIGHT knee Anterior cruciate ligament reconstruction with quadriceps tendon autograft, possible allograft All other indicated procedures Patient will begin physical therapy on postop day 2-3.  Will fax over protocol   Medical Diagnosis from Referral: ACL graft tear- initial encounter, knee instability- right  Evaluation Date: 1/20/2022  Authorization Period Expiration: 03/04/2022  Plan of Care Expiration: 9/20/22  Progress Note Due: 2/20/22  Visit # / Visits authorized:  6/12  FOTO: 2/5     Precautions: Standard      DATE OF PROCEDURE: 01/12/2022   POW: 4 weeks and 5 day(s) as of 2/14/22     SURGEON: Dorian Carty MD    PROCEDURE PERFORMED:   1. Right knee stage II ACL reconstruction with quadriceps tendon autograft     POSTOPERATIVE PLAN:  The patient will be weight-bearing as tolerated with the brace locked in extension.  He can range his knee from 0-90 degrees.  Return to clinic in 2 weeks for postoperative wound check and suture removal.      Time In: 1133  Time Out: 1233  Total Billable Time: 50 minutes    SUBJECTIVE     Pt reports: he was in an accident last week and was not able to come to last appointment. He has sharp pain to lateral aspect of R knee.  He was somewhat compliant with home exercise program.  Response to previous treatment: good  Functional change: walking better    Pain: 7/10  Location: right knee      OBJECTIVE     Objective Measures updated at  progress report unless specified.     Obervation: Pt ambulates into clinic w 1 axillary crutches and locked knee brace. R toe out, decreased knee ext in stance phase, and WBAT on R LE.     Treatment     Updated 2-14-22  R knee flexion: 100 deg AAROM  R knee extension: lacking -3 deg PROM    Bold = Performed    Radhames received the treatments listed below:      Therapeutic exercises to develop strength, endurance, ROM, flexibility, posture and core stabilization for 45 minutes including:     Nustep x 8 min 90 deg flexion level 3 at end of session  Seated hamstring stretch w strap 3 x 30 sec  +Supine IT band stretch w strap 3 x 30 sec  Standing calf stretch 3x30 sec   Min squat with brace 3x10  SLR no brace 2x10 with PT cueing  SL hip abd w/ no brace x 20  SL hip add w/ no brace x 20  Prone hip extension w/ no brace x 20  +Prone quad set x 20  Quad sets w NMES and half roll under ankle 3x10   SAQ 20x  Hold 3 sec  Quad set with heel lift 10x hold 1 sec (30x indep with PT)  Heel slide AAROM NP due to time  Heel props 5# 3min     Manual therapy techniques: for 5 minutes including:     Patellar mobs  Supine passive physiological flexion/ext c associated mobilization of tibiofemoral jt  Scar mobs  STM to R quad  Screw home mechanism with extension joint mob  Hyperext mob    Radhames received cold pack for R knee minutes to 10 min.    Neuromuscular re-education activities to improve: Coordination, Kinesthetic, Sense and Proprioception for 0 minutes. The following activities were included: combined with exercises above     [place] NMES 5 sec on/5 sec off, 0.5 sec ramp up, co-contract, 50% duty cycle, burst freq 50 bps x 0 min    Patient Education and Home Exercises     Home Exercises Provided and Patient Education Provided     Education provided:   - Pt educated on POC  - Pt educated on anatomy and physiology of current conditions as it relates to signs and symptoms  - Pt educated on HEP     Written Home Exercises Provided:  Patient instructed to cont prior HEP. Exercises were reviewed and Radhames was able to demonstrate them prior to the end of the session.  Radhames demonstrated fair  understanding of the education provided. See EMR under Patient Instructions for exercises provided during therapy sessions    ASSESSMENT     Pt tolerated PT session good. PT is 4 weeks and 5 day s/p Right knee stage II ACL reconstruction with quadriceps tendon autograft. Pt presents to clinic with impaired gait pattern and decreased knee extension AROM. Some improvements in knee extension ROM following manual therapy techniques and hamstring stretching with heel prop. He continues with decreased quad strength in terminal knee extension and mild joint swelling. Good patellar mobility noted. Achieved 100 deg knee flexion by end of session. IT band tightness noted with stretching. He requires heavy verbal encouragement to avoid excessive rest breaks with exercise program. Will continue to progress LE strength and knee ROM per protocol.     Radhames Is progressing well towards his goals.   Pt prognosis is Fair.     Pt will continue to benefit from skilled outpatient physical therapy to address the deficits listed in the problem list box on initial evaluation, provide pt/family education and to maximize pt's level of independence in the home and community environment.     Pt's spiritual, cultural and educational needs considered and pt agreeable to plan of care and goals.     Anticipated barriers to physical therapy: chronicity of condition, compliance    Goals:   GOALS: Short Term Goals:  16 weeks in progress  1. Report decreased R knee pain  <  / =  5/10 at worst to increase tolerance for prolonged standing.  2. Pt will be able to tolerate multi-directional LE strengthening in order to improve ability to perform age appropriate activities.  3. Pt will increase R knee extension AROM to 0 degrees to indicate improved gait pattern.   4. Pt will report 50% improvement  in ability to walk long distances since start of care to indicate improved functional mobility.   5. Pt to tolerate HEP to improve ROM and independence with ADL's.     Long Term Goals: 32 weeks in progress  1. Report decreased R knee pain  <  / =  3/10 at worst to increase tolerance for prolonged standing.  2. Pt will be able to perform 2 x 10 multi-directional LE strengthening without fatigue and no extensor lag in order to improve ability to perform age appropriate activities.  3. Pt will increase R knee flexion AROM to 130 degrees to indicate improved gait pattern.   4. Pt will report 80% improvement in ability to walk long distances since start of care to indicate improved functional mobility.  5. Pt to be Independent with HEP to improve ROM and independence with ADL's.  6. Pt will be able to demonstrate good dynamic R knee stability without valgus collapse to return to work.     PLAN     Continue with ACL protocol - focus on quad strength and knee ext AROM.    Nimisha Sanders, PT

## 2022-02-14 ENCOUNTER — CLINICAL SUPPORT (OUTPATIENT)
Dept: REHABILITATION | Facility: HOSPITAL | Age: 27
End: 2022-02-14
Attending: STUDENT IN AN ORGANIZED HEALTH CARE EDUCATION/TRAINING PROGRAM
Payer: MEDICAID

## 2022-02-14 DIAGNOSIS — Z74.09 IMPAIRED FUNCTIONAL MOBILITY, BALANCE, GAIT, AND ENDURANCE: Primary | ICD-10-CM

## 2022-02-14 DIAGNOSIS — M25.661 DECREASED RANGE OF MOTION (ROM) OF RIGHT KNEE: ICD-10-CM

## 2022-02-14 DIAGNOSIS — M62.81 MUSCLE WEAKNESS OF LOWER EXTREMITY: ICD-10-CM

## 2022-02-14 PROCEDURE — 97110 THERAPEUTIC EXERCISES: CPT | Mod: PN

## 2022-02-17 NOTE — PROGRESS NOTES
[facility]   Physical Therapy Treatment Note     [id number]    Therapy Diagnosis:   Encounter Diagnoses   Name Primary?    Impaired functional mobility, balance, gait, and endurance Yes    Muscle weakness of lower extremity     Decreased range of motion (ROM) of right knee      Physician: Dorian Carty MD    Visit Date: 2/18/2022      Physician Orders: PT Eval and Treat - Patient is scheduled to undergo the following procedure with Dorian Carty MD on 01/12/2022 RIGHT knee Anterior cruciate ligament reconstruction with quadriceps tendon autograft, possible allograft All other indicated procedures Patient will begin physical therapy on postop day 2-3.  Will fax over protocol   Medical Diagnosis from Referral: ACL graft tear- initial encounter, knee instability- right  Evaluation Date: 1/20/2022  Authorization Period Expiration: 03/04/2022  Plan of Care Expiration: 9/20/22  Progress Note Due: 2/20/22  Visit # / Visits authorized: 7/12  FOTO: 2/5     Precautions: Standard      DATE OF PROCEDURE: 01/12/2022   POW: 5 weeks and 2 day(s) as of 2/18/22     SURGEON: Dorian Carty MD    PROCEDURE PERFORMED:   1. Right knee stage II ACL reconstruction with quadriceps tendon autograft     POSTOPERATIVE PLAN:  The patient will be weight-bearing as tolerated with the brace locked in extension.  He can range his knee from 0-90 degrees.  Return to clinic in 2 weeks for postoperative wound check and suture removal.      Time In: 1143  Time Out: 1245  Total Billable Time: 62 minutes    SUBJECTIVE     Pt reports: his knee brace is cutting into his leg and is uncomfortable. His missed last appointment because he was running late about 5 minutes and no one answered the phone when he tried to call.   He was somewhat compliant with home exercise program.  Response to previous treatment: good  Functional change: able to go and down stairs better with no  difficulty or pain    Pain: 0/10  Location: right knee      OBJECTIVE     Objective Measures updated at progress report unless specified.     Treatment     Updated 2-18-22  R knee flexion: 100 deg AAROM  R knee extension: 0 deg PROM    Bold = Performed    Radhames received the treatments listed below:      Therapeutic exercises to develop strength, endurance, ROM, flexibility, posture and core stabilization for 57 minutes including:     Nustep x 8 min 90 deg flexion level 3 at end of session  +Upright bike Star Trac x 6 min at end of session    Seated hamstring stretch w strap 3 x 30 sec  Supine IT band stretch w strap 3 x 30 sec  Standing calf stretch 3x30 sec   Min squat with brace 2x10  SLR no brace 2x10 with PT cueing  SL hip abd w/ no brace x 20  SL hip add w/ no brace x 20  Prone hip extension w/ no brace x 20  Prone quad set x 20  Quad sets w NMES and half roll under ankle 3x10   SAQ 20x  Hold 3 sec   Quad set with heel lift 10x hold 1 sec (30x indep with PT)  Heel slide AAROM NP due to time  Heel props 5# 3min   +Step ups 6 in x 20  +Heel raises x 20  +Standing hip abd x 20 ea  +Standing march x 20 ea    Manual therapy techniques: for 5 minutes including:     Patellar mobs  Supine passive physiological flexion/ext c associated mobilization of tibiofemoral jt  Scar mobs  STM to R quad  Screw home mechanism with extension joint mob  Hyperext mob    Radhames received cold pack for R knee minutes to 0 min.    Patient Education and Home Exercises     Home Exercises Provided and Patient Education Provided     Education provided:   - Pt educated on POC  - Pt educated on anatomy and physiology of current conditions as it relates to signs and symptoms  - Pt educated on HEP     Written Home Exercises Provided: yes. 2/18/22. Exercises were reviewed and Radhames was able to demonstrate them prior to the end of the session.  Radhames demonstrated fair  understanding of the education provided. See EMR under Patient Instructions for  exercises provided during therapy sessions    ASSESSMENT     Skyler tolerated PT session good. PT is 5 weeks and 2 days s/p Right knee stage II ACL reconstruction with quadriceps tendon autograft. Pt presents to clinic without brace donned and no AD. Improvement noted in gait pattern with increased knee extension and avoidance of excess toe out. Time spent adjusting brace and educating pt on proper use. Pt on phone for majority of treatment session. Good response to progression of exercise program. Frequent verbal cueing for proper foot positioning with therapeutic exercises. Able to perform upright bike with full revolutions without difficulty. Will continue to progress LE strength and knee ROM per protocol.     Radhames Is progressing well towards his goals.   Pt prognosis is Fair.     Pt will continue to benefit from skilled outpatient physical therapy to address the deficits listed in the problem list box on initial evaluation, provide pt/family education and to maximize pt's level of independence in the home and community environment.     Pt's spiritual, cultural and educational needs considered and pt agreeable to plan of care and goals.     Anticipated barriers to physical therapy: chronicity of condition, compliance    Goals:   GOALS: Short Term Goals:  16 weeks in progress  1. Report decreased R knee pain  <  / =  5/10 at worst to increase tolerance for prolonged standing.  2. Pt will be able to tolerate multi-directional LE strengthening in order to improve ability to perform age appropriate activities.  3. Pt will increase R knee extension AROM to 0 degrees to indicate improved gait pattern.   4. Pt will report 50% improvement in ability to walk long distances since start of care to indicate improved functional mobility.   5. Pt to tolerate HEP to improve ROM and independence with ADL's.     Long Term Goals: 32 weeks in progress  1. Report decreased R knee pain  <  / =  3/10 at worst to increase tolerance for  prolonged standing.  2. Pt will be able to perform 2 x 10 multi-directional LE strengthening without fatigue and no extensor lag in order to improve ability to perform age appropriate activities.  3. Pt will increase R knee flexion AROM to 130 degrees to indicate improved gait pattern.   4. Pt will report 80% improvement in ability to walk long distances since start of care to indicate improved functional mobility.  5. Pt to be Independent with HEP to improve ROM and independence with ADL's.  6. Pt will be able to demonstrate good dynamic R knee stability without valgus collapse to return to work.     PLAN     Continue with ACL protocol - focus on quad strength and knee ext AROM.    Nimisha Sanders, PT

## 2022-02-18 ENCOUNTER — CLINICAL SUPPORT (OUTPATIENT)
Dept: REHABILITATION | Facility: HOSPITAL | Age: 27
End: 2022-02-18
Attending: STUDENT IN AN ORGANIZED HEALTH CARE EDUCATION/TRAINING PROGRAM
Payer: MEDICAID

## 2022-02-18 DIAGNOSIS — Z74.09 IMPAIRED FUNCTIONAL MOBILITY, BALANCE, GAIT, AND ENDURANCE: Primary | ICD-10-CM

## 2022-02-18 DIAGNOSIS — M62.81 MUSCLE WEAKNESS OF LOWER EXTREMITY: ICD-10-CM

## 2022-02-18 DIAGNOSIS — M25.661 DECREASED RANGE OF MOTION (ROM) OF RIGHT KNEE: ICD-10-CM

## 2022-02-18 PROCEDURE — 97110 THERAPEUTIC EXERCISES: CPT | Mod: PN

## 2022-02-21 NOTE — PROGRESS NOTES
[facility]   Physical Therapy Treatment Note     Name: Radhames Jonas  [facility] Number: [id number]    Therapy Diagnosis:   Encounter Diagnoses   Name Primary?    Impaired functional mobility, balance, gait, and endurance Yes    Muscle weakness of lower extremity     Decreased range of motion (ROM) of right knee      Physician: Dorian Carty MD    Visit Date: 2/22/2022      Physician Orders: PT Eval and Treat - Patient is scheduled to undergo the following procedure with Dorian Carty MD on 01/12/2022 RIGHT knee Anterior cruciate ligament reconstruction with quadriceps tendon autograft, possible allograft All other indicated procedures Patient will begin physical therapy on postop day 2-3.  Will fax over protocol   Medical Diagnosis from Referral: ACL graft tear- initial encounter, knee instability- right  Evaluation Date: 1/20/2022  Authorization Period Expiration: 03/04/2022  Plan of Care Expiration: 9/20/22  Progress Note Due: 3/22/22  Visit # / Visits authorized: 8/12  FOTO: 2/5     Precautions: Standard      DATE OF PROCEDURE: 01/12/2022   POW: 5 weeks and 6 day(s) as of 2/22/22     SURGEON: Dorian Carty MD    PROCEDURE PERFORMED:   1. Right knee stage II ACL reconstruction with quadriceps tendon autograft     POSTOPERATIVE PLAN:  The patient will be weight-bearing as tolerated with the brace locked in extension.  He can range his knee from 0-90 degrees.  Return to clinic in 2 weeks for postoperative wound check and suture removal.      Time In: 0816  Time Out: 0916  Total Billable Time: 60 minutes    SUBJECTIVE     Pt reports: his knee has been feeling good. It occasionally pops but does not hurt. Pain is at worst 0/10. 85% improvement in ability to walk long distances since start of care.   He was somewhat compliant with home exercise program.  Response to previous treatment: good with no soreness  Functional change: better stair ambulation    Pain: 0/10  Location:  right knee      OBJECTIVE     Objective Measures updated at progress report unless specified.     Obervation: Pt ambulates into clinic w 1 axillary crutches and locked knee brace. R toe out, decreased knee ext in stance phase, and WBAT on R LE.     Treatment     Updated 2-22-22  R knee flexion: 135 deg AROM  R knee extension: 0 deg AROM     Bold = Performed    Radhames received the treatments listed below:      Therapeutic exercises to develop strength, endurance, ROM, flexibility, posture and core stabilization for 60 minutes including:     Nustep x 8 min 90 deg flexion level 3 at end of session  Upright bike Star Trac x 7 min at end of session    Seated hamstring stretch w strap 3 x 30 sec  Supine IT band stretch w strap 3 x 30 sec  Standing calf stretch 3x30 sec   Mini squat with brace 2x10  SLR no brace 2x10 with PT cueing  SL hip abd w/ no brace x 20  SL hip add w/ no brace x 20  Prone hip extension w/ no brace x 20  Prone quad set x 20  Quad sets and half roll under ankle w heel clearance 2 x 10   +Bridges x 20  SAQ 20x  Hold 3 sec   Heel slide AAROM NP due to time  Heel props 5# 3min   Step ups 6 in x 20 ea  Heel raises x 20  Standing hip abd x 20 ea  Standing march x 20 ea  +SLS w rebounder red ball x 20    Manual therapy techniques: for 0 minutes including:     Patellar mobs  Supine passive physiological flexion/ext c associated mobilization of tibiofemoral jt  Scar mobs  STM to R quad  Screw home mechanism with extension joint mob  Hyperext mob    Radhames received cold pack for R knee minutes to 0 min.    Patient Education and Home Exercises     Home Exercises Provided and Patient Education Provided     Education provided:   - Pt educated on POC  - Pt educated on anatomy and physiology of current conditions as it relates to signs and symptoms  - Pt educated on HEP     Written Home Exercises Provided: Patient instructed to cont prior HEP. 2/18/22. Exercises were reviewed and Radhames was able to demonstrate them  prior to the end of the session.  Radhames demonstrated fair  understanding of the education provided. See EMR under Patient Instructions for exercises provided during therapy sessions    ASSESSMENT     Skyler was re-assessed today with 5/5 STGs being met indicating improvements in R knee pain, R knee extension AROM, ability to walk long distances, and tolerance for LE strengthening and HEP since start of care. He continues with LE weakness, impaired gait, muscle tightness, and decreased functional mobility. Pt could benefit from continued physical therapy services to address deficits.     He tolerated PT session good. PT is 5 weeks and 6 days s/p Right knee stage II ACL reconstruction with quadriceps tendon autograft. Post-treatment R knee pain rated as 0/10. Pt presents to clinic with brace donned and increased independence with handling of brace. He was challenged with heel raises demonstrating B pes planus and B foot fatigue. Occasional rest breaks required with standing LE strengthening. Achieved 135 deg knee flexion AROM with no difficulty. Increased postural sway with single leg stance and perturbations. Will continue to progress LE strength and knee ROM per protocol.     Radhames Is progressing well towards his goals.   Pt prognosis is Fair.     Pt will continue to benefit from skilled outpatient physical therapy to address the deficits listed in the problem list box on initial evaluation, provide pt/family education and to maximize pt's level of independence in the home and community environment.     Pt's spiritual, cultural and educational needs considered and pt agreeable to plan of care and goals.     Anticipated barriers to physical therapy: chronicity of condition, compliance    Goals:   GOALS: Short Term Goals:  16 weeks   1. Report decreased R knee pain  <  / =  5/10 at worst to increase tolerance for prolonged standing.- met 2/22/22  2. Pt will be able to tolerate multi-directional LE strengthening in  order to improve ability to perform age appropriate activities.- met 2/22/22  3. Pt will increase R knee extension AROM to 0 degrees to indicate improved gait pattern.- met 2/22/22   4. Pt will report 50% improvement in ability to walk long distances since start of care to indicate improved functional mobility.- met 2/22/22   5. Pt to tolerate HEP to improve ROM and independence with ADL's.- met 2/22/22     Long Term Goals: 32 weeks in progress  1. Report decreased R knee pain  <  / =  3/10 at worst to increase tolerance for prolonged standing.  2. Pt will be able to perform 2 x 10 multi-directional LE strengthening without fatigue and no extensor lag in order to improve ability to perform age appropriate activities.  3. Pt will increase R knee flexion AROM to 130 degrees to indicate improved gait pattern.   4. Pt will report 80% improvement in ability to walk long distances since start of care to indicate improved functional mobility.  5. Pt to be Independent with HEP to improve ROM and independence with ADL's.  6. Pt will be able to demonstrate good dynamic R knee stability without valgus collapse to return to work.     PLAN     Plan of care Certification: 1/20/2022 to 9/20/22.     Outpatient Physical Therapy 3 times weekly for 32 weeks to include the following interventions: Electrical Stimulation NMES, Gait Training, Manual Therapy, Moist Heat/ Ice, Neuromuscular Re-ed, Orthotic Management and Training, Patient Education, Therapeutic Activities and Therapeutic Exercise.     Continue with ACL protocol - focus on quad strength and knee ext AROM.    Nimisha Sanders, PT

## 2022-02-22 ENCOUNTER — CLINICAL SUPPORT (OUTPATIENT)
Dept: REHABILITATION | Facility: HOSPITAL | Age: 27
End: 2022-02-22
Attending: STUDENT IN AN ORGANIZED HEALTH CARE EDUCATION/TRAINING PROGRAM
Payer: MEDICAID

## 2022-02-22 DIAGNOSIS — M62.81 MUSCLE WEAKNESS OF LOWER EXTREMITY: ICD-10-CM

## 2022-02-22 DIAGNOSIS — Z74.09 IMPAIRED FUNCTIONAL MOBILITY, BALANCE, GAIT, AND ENDURANCE: Primary | ICD-10-CM

## 2022-02-22 DIAGNOSIS — M25.661 DECREASED RANGE OF MOTION (ROM) OF RIGHT KNEE: ICD-10-CM

## 2022-02-22 PROCEDURE — 97110 THERAPEUTIC EXERCISES: CPT | Mod: PN

## 2022-02-25 ENCOUNTER — CLINICAL SUPPORT (OUTPATIENT)
Dept: REHABILITATION | Facility: HOSPITAL | Age: 27
End: 2022-02-25
Attending: STUDENT IN AN ORGANIZED HEALTH CARE EDUCATION/TRAINING PROGRAM
Payer: MEDICAID

## 2022-02-25 DIAGNOSIS — M25.661 DECREASED RANGE OF MOTION (ROM) OF RIGHT KNEE: ICD-10-CM

## 2022-02-25 DIAGNOSIS — M62.81 MUSCLE WEAKNESS OF LOWER EXTREMITY: ICD-10-CM

## 2022-02-25 DIAGNOSIS — Z74.09 IMPAIRED FUNCTIONAL MOBILITY, BALANCE, GAIT, AND ENDURANCE: Primary | ICD-10-CM

## 2022-02-25 PROCEDURE — 97110 THERAPEUTIC EXERCISES: CPT | Mod: PN

## 2022-02-25 NOTE — PROGRESS NOTES
[facility]   Physical Therapy Treatment Note     [id number]    Therapy Diagnosis:   Encounter Diagnoses   Name Primary?    Impaired functional mobility, balance, gait, and endurance Yes    Muscle weakness of lower extremity     Decreased range of motion (ROM) of right knee      Physician: Dorian Carty MD    Visit Date: 2/25/2022      Physician Orders: PT Eval and Treat - Patient is scheduled to undergo the following procedure with Dorian Carty MD on 01/12/2022 RIGHT knee Anterior cruciate ligament reconstruction with quadriceps tendon autograft, possible allograft All other indicated procedures Patient will begin physical therapy on postop day 2-3.  Will fax over protocol   Medical Diagnosis from Referral: ACL graft tear- initial encounter, knee instability- right  Evaluation Date: 1/20/2022  Authorization Period Expiration: 03/04/2022  Plan of Care Expiration: 9/20/22  Progress Note Due: 3/22/22  Visit # / Visits authorized: 8/12  FOTO: 2/5     Precautions: Standard      DATE OF PROCEDURE: 01/12/2022   POW: 6 weeks and 2 day(s) as of 2/22/22     SURGEON: Dorian Carty MD    PROCEDURE PERFORMED:   1. Right knee stage II ACL reconstruction with quadriceps tendon autograft     POSTOPERATIVE PLAN:  The patient will be weight-bearing as tolerated with the brace locked in extension.  He can range his knee from 0-90 degrees.  Return to clinic in 2 weeks for postoperative wound check and suture removal.      Time In: 1:00 pm  Time Out: 1:53 pm  Total Billable Time: 53 minutes    SUBJECTIVE     Pt reports: his knee has been feeling good. I have not been doing my exercises.   He was somewhat compliant with home exercise program.  Response to previous treatment: good with no soreness  Functional change: better stair ambulation    Pain: 3/10  Location: right knee      OBJECTIVE     Objective Measures updated at progress report unless  specified.     Obervation: Pt ambulates into clinic w 1 axillary crutches and locked knee brace. R toe out, decreased knee ext in stance phase, and WBAT on R LE.     Treatment     Updated 2-22-22  R knee flexion: 135 deg AROM  R knee extension: 0 deg AROM     Bold = Performed    Radhames received the treatments listed below:      Therapeutic exercises to develop strength, endurance, ROM, flexibility, posture and core stabilization for 60 minutes including:     Nustep x 8 min 90 deg flexion level 3 at end of session  Upright bike Star Trac x 10 min at end of session    Heel raises x30  Gastroc stretch 2 x 45 s  Mini squat with brace 2x10  Standing hip abd x 20 ea  Standing march x 20 ea  SLR no brace 2x10 with PT cueing  SL hip abd w/ no brace x 20  SL hip add w/ no brace x 20  Prone hip extension w/ no brace x 20  Supine hamstring stretch w strap 3 x 30 sec  Bridges x 20  SLS 2 x 30s    Supine IT band stretch w strap 3 x 30 sec  Prone quad set x 20  Quad sets and half roll under ankle w heel clearance 2 x 10   SAQ 20x  Hold 3 sec   Heel slide AAROM NP due to time  Heel props 5# 3min   Step ups 6 in x 20 ea  SLS w rebounder red ball x 20    Manual therapy techniques: for 0 minutes including:     Patellar mobs  Supine passive physiological flexion/ext c associated mobilization of tibiofemoral jt  Scar mobs  STM to R quad  Screw home mechanism with extension joint mob  Hyperext mob    Radhames received cold pack for R knee minutes to 0 min.    Patient Education and Home Exercises     Home Exercises Provided and Patient Education Provided     Education provided:   - Pt educated on POC  - Pt educated on anatomy and physiology of current conditions as it relates to signs and symptoms  - Pt educated on HEP     Written Home Exercises Provided: Patient instructed to cont prior HEP. 2/18/22. Exercises were reviewed and Radhames was able to demonstrate them prior to the end of the session.  Radhames demonstrated fair  understanding of the  education provided. See EMR under Patient Instructions for exercises provided during therapy sessions    ASSESSMENT   He tolerated PT session good. PT is 6 weeks and 2 days s/p Right knee stage II ACL reconstruction with quadriceps tendon autograft. Continued Post-treatment R knee pain rated as 0/10. Pt presents to clinic with brace donned. Occasional rest breaks required with standing LE strengthening. Will continue to progress LE strength and knee ROM per protocol.     Radhames Is progressing well towards his goals.   Pt prognosis is Fair.     Pt will continue to benefit from skilled outpatient physical therapy to address the deficits listed in the problem list box on initial evaluation, provide pt/family education and to maximize pt's level of independence in the home and community environment.     Pt's spiritual, cultural and educational needs considered and pt agreeable to plan of care and goals.     Anticipated barriers to physical therapy: chronicity of condition, compliance    Goals:   GOALS: Short Term Goals:  16 weeks   1. Report decreased R knee pain  <  / =  5/10 at worst to increase tolerance for prolonged standing.- met 2/22/22  2. Pt will be able to tolerate multi-directional LE strengthening in order to improve ability to perform age appropriate activities.- met 2/22/22  3. Pt will increase R knee extension AROM to 0 degrees to indicate improved gait pattern.- met 2/22/22   4. Pt will report 50% improvement in ability to walk long distances since start of care to indicate improved functional mobility.- met 2/22/22   5. Pt to tolerate HEP to improve ROM and independence with ADL's.- met 2/22/22     Long Term Goals: 32 weeks in progress  1. Report decreased R knee pain  <  / =  3/10 at worst to increase tolerance for prolonged standing.  2. Pt will be able to perform 2 x 10 multi-directional LE strengthening without fatigue and no extensor lag in order to improve ability to perform age appropriate  activities.  3. Pt will increase R knee flexion AROM to 130 degrees to indicate improved gait pattern.   4. Pt will report 80% improvement in ability to walk long distances since start of care to indicate improved functional mobility.  5. Pt to be Independent with HEP to improve ROM and independence with ADL's.  6. Pt will be able to demonstrate good dynamic R knee stability without valgus collapse to return to work.     PLAN     Plan of care Certification: 1/20/2022 to 9/20/22.     Outpatient Physical Therapy 3 times weekly for 32 weeks to include the following interventions: Electrical Stimulation NMES, Gait Training, Manual Therapy, Moist Heat/ Ice, Neuromuscular Re-ed, Orthotic Management and Training, Patient Education, Therapeutic Activities and Therapeutic Exercise.     Continue with ACL protocol - focus on quad strength and knee ext AROM.    Samreen Rice, PT

## 2022-03-02 ENCOUNTER — CLINICAL SUPPORT (OUTPATIENT)
Dept: REHABILITATION | Facility: HOSPITAL | Age: 27
End: 2022-03-02
Attending: STUDENT IN AN ORGANIZED HEALTH CARE EDUCATION/TRAINING PROGRAM
Payer: MEDICAID

## 2022-03-02 DIAGNOSIS — Z74.09 IMPAIRED FUNCTIONAL MOBILITY, BALANCE, GAIT, AND ENDURANCE: Primary | ICD-10-CM

## 2022-03-02 DIAGNOSIS — M25.661 DECREASED RANGE OF MOTION (ROM) OF RIGHT KNEE: ICD-10-CM

## 2022-03-02 DIAGNOSIS — M62.81 MUSCLE WEAKNESS OF LOWER EXTREMITY: ICD-10-CM

## 2022-03-02 PROCEDURE — 97110 THERAPEUTIC EXERCISES: CPT | Mod: PN

## 2022-03-02 NOTE — PROGRESS NOTES
[facility]   Physical Therapy Treatment Note     Name: Radhames Jonas  [facility] Number: [id number]    Therapy Diagnosis:   Encounter Diagnoses   Name Primary?    Impaired functional mobility, balance, gait, and endurance Yes    Muscle weakness of lower extremity     Decreased range of motion (ROM) of right knee      Physician: Dorian Carty MD    Visit Date: 3/2/2022      Physician Orders: PT Eval and Treat - Patient is scheduled to undergo the following procedure with Dorian Carty MD on 01/12/2022 RIGHT knee Anterior cruciate ligament reconstruction with quadriceps tendon autograft, possible allograft All other indicated procedures Patient will begin physical therapy on postop day 2-3.  Will fax over protocol   Medical Diagnosis from Referral: ACL graft tear- initial encounter, knee instability- right  Evaluation Date: 1/20/2022  Authorization Period Expiration: 03/04/2022  Plan of Care Expiration: 9/20/22  Progress Note Due: 3/22/22  Visit # / Visits authorized: 10/12  FOTO: 2/5     Precautions: Standard      DATE OF PROCEDURE: 01/12/2022   POW: 7 weeks and 0 day(s) as of 3/2/22     SURGEON: Dorian Carty MD    PROCEDURE PERFORMED:   1. Right knee stage II ACL reconstruction with quadriceps tendon autograft     POSTOPERATIVE PLAN:  The patient will be weight-bearing as tolerated with the brace locked in extension.  He can range his knee from 0-90 degrees.  Return to clinic in 2 weeks for postoperative wound check and suture removal.      Time In: 10:30 am  Time Out: 11:15 am  Total Billable Time: 45 minutes    SUBJECTIVE     Pt reports: his R knee is doing ok. He does not have major pain today. Pt state he is able to ascending and descending stairs with no problem. Pt denies an R knee pain.   He was somewhat compliant with home exercise program.  Response to previous treatment: good with no soreness  Functional change: better stair ambulation    Pain: 0/10  Location:  right knee      OBJECTIVE     Objective Measures updated at progress report unless specified.     Obervation: Pt ambulates into clinic without crutches today. Decrease heel to toes gait pattern.     Treatment     Updated 2-22-22  R knee flexion: 135 deg AROM  R knee extension: 0 deg AROM     Bold = Performed    Radhames received the treatments listed below:      Therapeutic exercises to develop strength, endurance, ROM, flexibility, posture and core stabilization for 45 minutes including:     Upright bike Star Trac x 10 min at seat level 3-->2    Gastroc stretch 3 x 30 s  Stairs hamstring stretch 3x30 sec  Heel raises x20  Mini squat 3x10  Standing hip abd x 30 ea  Standing hip extension 30 ea  Standing march x 30 ea  Shuttle DL squat 1 red band 3x10  Shuttle SL squat 1 red band 1x10   SLR no brace 2x10 with PT cueing  Bridges x 20  SLS 2 x 30s      Manual therapy techniques: for 0 minutes including:     Patellar mobs  Supine passive physiological flexion/ext c associated mobilization of tibiofemoral jt  Scar mobs  STM to R quad  Screw home mechanism with extension joint mob  Hyperext mob    Radhames received cold pack for R knee minutes to 0 min.    Patient Education and Home Exercises     Home Exercises Provided and Patient Education Provided     Education provided:   - Pt educated on POC  - Pt educated on anatomy and physiology of current conditions as it relates to signs and symptoms  - Pt educated on HEP     Written Home Exercises Provided: Patient instructed to cont prior HEP. 2/18/22. Exercises were reviewed and Radhames was able to demonstrate them prior to the end of the session.  Radhames demonstrated fair  understanding of the education provided. See EMR under Patient Instructions for exercises provided during therapy sessions    ASSESSMENT     He tolerated PT session good. Pt is 7 weeks and 0 days s/p Right knee stage II ACL reconstruction with quadriceps tendon autograft. Continued Post-treatment R knee pain rated  as 0/10. Pt presents to clinic without brace today. Decrease heel to toes gait pattern. Progression of CKC exercises. Pt demonstrated weakness of quad and glutes muscles. Addition of Standing hip extension, hamstring stretch at stairs, shuttle DL squat and Shuttle SL squat. Pt required mod vc/s' to perform exercises with proper form. Pt has been improving in therapy.  Will continue to progress LE strength and knee ROM per protocol.     Radhames Is progressing well towards his goals.   Pt prognosis is Fair.     Pt will continue to benefit from skilled outpatient physical therapy to address the deficits listed in the problem list box on initial evaluation, provide pt/family education and to maximize pt's level of independence in the home and community environment.     Pt's spiritual, cultural and educational needs considered and pt agreeable to plan of care and goals.     Anticipated barriers to physical therapy: chronicity of condition, compliance    Goals:   GOALS: Short Term Goals:  16 weeks   1. Report decreased R knee pain  <  / =  5/10 at worst to increase tolerance for prolonged standing.- met 2/22/22  2. Pt will be able to tolerate multi-directional LE strengthening in order to improve ability to perform age appropriate activities.- met 2/22/22  3. Pt will increase R knee extension AROM to 0 degrees to indicate improved gait pattern.- met 2/22/22   4. Pt will report 50% improvement in ability to walk long distances since start of care to indicate improved functional mobility.- met 2/22/22   5. Pt to tolerate HEP to improve ROM and independence with ADL's.- met 2/22/22     Long Term Goals: 32 weeks in progress  1. Report decreased R knee pain  <  / =  3/10 at worst to increase tolerance for prolonged standing.  2. Pt will be able to perform 2 x 10 multi-directional LE strengthening without fatigue and no extensor lag in order to improve ability to perform age appropriate activities.  3. Pt will increase R knee  flexion AROM to 130 degrees to indicate improved gait pattern.   4. Pt will report 80% improvement in ability to walk long distances since start of care to indicate improved functional mobility.  5. Pt to be Independent with HEP to improve ROM and independence with ADL's.  6. Pt will be able to demonstrate good dynamic R knee stability without valgus collapse to return to work.     PLAN     Plan of care Certification: 1/20/2022 to 9/20/22.     Outpatient Physical Therapy 3 times weekly for 32 weeks to include the following interventions: Electrical Stimulation NMES, Gait Training, Manual Therapy, Moist Heat/ Ice, Neuromuscular Re-ed, Orthotic Management and Training, Patient Education, Therapeutic Activities and Therapeutic Exercise.     Continue with ACL protocol - focus on quad strength and knee ext AROM.    Reji Howell, PT

## 2022-03-10 ENCOUNTER — HOSPITAL ENCOUNTER (OUTPATIENT)
Dept: RADIOLOGY | Facility: HOSPITAL | Age: 27
Discharge: HOME | End: 2022-03-10
Attending: STUDENT IN AN ORGANIZED HEALTH CARE EDUCATION/TRAINING PROGRAM
Payer: MEDICAID

## 2022-03-10 ENCOUNTER — OFFICE VISIT (OUTPATIENT)
Dept: SPORTS MEDICINE | Facility: CLINIC | Age: 27
End: 2022-03-10
Payer: MEDICAID

## 2022-03-10 VITALS
SYSTOLIC BLOOD PRESSURE: 130 MMHG | HEART RATE: 73 BPM | WEIGHT: 230 LBS | BODY MASS INDEX: 36.02 KG/M2 | DIASTOLIC BLOOD PRESSURE: 87 MMHG

## 2022-03-10 DIAGNOSIS — M25.561 RIGHT KNEE PAIN, UNSPECIFIED CHRONICITY: Primary | ICD-10-CM

## 2022-03-10 DIAGNOSIS — M25.561 RIGHT KNEE PAIN, UNSPECIFIED CHRONICITY: ICD-10-CM

## 2022-03-10 DIAGNOSIS — Z98.890 S/P ACL RECONSTRUCTION: ICD-10-CM

## 2022-03-10 PROCEDURE — 3075F PR MOST RECENT SYSTOLIC BLOOD PRESS GE 130-139MM HG: ICD-10-PCS | Mod: CPTII,,, | Performed by: STUDENT IN AN ORGANIZED HEALTH CARE EDUCATION/TRAINING PROGRAM

## 2022-03-10 PROCEDURE — 99213 OFFICE O/P EST LOW 20 MIN: CPT | Mod: PBBFAC | Performed by: STUDENT IN AN ORGANIZED HEALTH CARE EDUCATION/TRAINING PROGRAM

## 2022-03-10 PROCEDURE — 3079F PR MOST RECENT DIASTOLIC BLOOD PRESSURE 80-89 MM HG: ICD-10-PCS | Mod: CPTII,,, | Performed by: STUDENT IN AN ORGANIZED HEALTH CARE EDUCATION/TRAINING PROGRAM

## 2022-03-10 PROCEDURE — 73560 XR KNEE 1 OR 2 VIEW RIGHT: ICD-10-PCS | Mod: 26,RT,, | Performed by: RADIOLOGY

## 2022-03-10 PROCEDURE — 1159F PR MEDICATION LIST DOCUMENTED IN MEDICAL RECORD: ICD-10-PCS | Mod: CPTII,,, | Performed by: STUDENT IN AN ORGANIZED HEALTH CARE EDUCATION/TRAINING PROGRAM

## 2022-03-10 PROCEDURE — 3079F DIAST BP 80-89 MM HG: CPT | Mod: CPTII,,, | Performed by: STUDENT IN AN ORGANIZED HEALTH CARE EDUCATION/TRAINING PROGRAM

## 2022-03-10 PROCEDURE — 99999 PR PBB SHADOW E&M-EST. PATIENT-LVL III: CPT | Mod: PBBFAC,,, | Performed by: STUDENT IN AN ORGANIZED HEALTH CARE EDUCATION/TRAINING PROGRAM

## 2022-03-10 PROCEDURE — 1160F RVW MEDS BY RX/DR IN RCRD: CPT | Mod: CPTII,,, | Performed by: STUDENT IN AN ORGANIZED HEALTH CARE EDUCATION/TRAINING PROGRAM

## 2022-03-10 PROCEDURE — 1159F MED LIST DOCD IN RCRD: CPT | Mod: CPTII,,, | Performed by: STUDENT IN AN ORGANIZED HEALTH CARE EDUCATION/TRAINING PROGRAM

## 2022-03-10 PROCEDURE — 3008F BODY MASS INDEX DOCD: CPT | Mod: CPTII,,, | Performed by: STUDENT IN AN ORGANIZED HEALTH CARE EDUCATION/TRAINING PROGRAM

## 2022-03-10 PROCEDURE — 73560 X-RAY EXAM OF KNEE 1 OR 2: CPT | Mod: 26,RT,, | Performed by: RADIOLOGY

## 2022-03-10 PROCEDURE — 73560 X-RAY EXAM OF KNEE 1 OR 2: CPT | Mod: TC,RT

## 2022-03-10 PROCEDURE — 99024 POSTOP FOLLOW-UP VISIT: CPT | Mod: ,,, | Performed by: STUDENT IN AN ORGANIZED HEALTH CARE EDUCATION/TRAINING PROGRAM

## 2022-03-10 PROCEDURE — 1160F PR REVIEW ALL MEDS BY PRESCRIBER/CLIN PHARMACIST DOCUMENTED: ICD-10-PCS | Mod: CPTII,,, | Performed by: STUDENT IN AN ORGANIZED HEALTH CARE EDUCATION/TRAINING PROGRAM

## 2022-03-10 PROCEDURE — 3008F PR BODY MASS INDEX (BMI) DOCUMENTED: ICD-10-PCS | Mod: CPTII,,, | Performed by: STUDENT IN AN ORGANIZED HEALTH CARE EDUCATION/TRAINING PROGRAM

## 2022-03-10 PROCEDURE — 99024 PR POST-OP FOLLOW-UP VISIT: ICD-10-PCS | Mod: ,,, | Performed by: STUDENT IN AN ORGANIZED HEALTH CARE EDUCATION/TRAINING PROGRAM

## 2022-03-10 PROCEDURE — 3075F SYST BP GE 130 - 139MM HG: CPT | Mod: CPTII,,, | Performed by: STUDENT IN AN ORGANIZED HEALTH CARE EDUCATION/TRAINING PROGRAM

## 2022-03-10 PROCEDURE — 99999 PR PBB SHADOW E&M-EST. PATIENT-LVL III: ICD-10-PCS | Mod: PBBFAC,,, | Performed by: STUDENT IN AN ORGANIZED HEALTH CARE EDUCATION/TRAINING PROGRAM

## 2022-03-10 NOTE — PROGRESS NOTES
Subjective:          Chief Complaint: Radhames Jonas is a 26 y.o. male who had concerns including Pain of the Right Knee.    Pain  Associated symptoms include joint swelling.     Patient is here today s/p right ACL reconstruction for 6 week post operative evaluation. He states that his pain is a 5/10 at its worst and locates the pain as over the lateral aspect of the right femoral condyle. He comes in today without use of the T-Scope brace stating that physical therapy instructed him to discontinue use at about 4 weeks post op. He has been compliant with physical therapy going 3x week at the [facility] location.  He does have some pain, but overall feels like he is continuing to improve.  Denies any episodes of instability to his knee, stating it feels more stable than it has previously.      Procedure(s) (LRB) with Dorian Carty MD on 01/12/2022:  REVISION RECONSTRUCTION, KNEE, ACL, ARTHROSCOPIC, WITH QUAD TENDON AUTOGRAFT (Right)    Review of Systems   Constitutional: Negative.   HENT: Negative.    Eyes: Negative.    Cardiovascular: Negative.    Respiratory: Negative.    Endocrine: Negative.    Hematologic/Lymphatic: Negative.    Skin: Negative.    Musculoskeletal: Positive for joint pain and joint swelling.   Neurological: Negative.    Psychiatric/Behavioral: Negative.    Allergic/Immunologic: Negative.        Pain Related Questions  Over the past 3 days, what was your average pain during activity? (I.e. running, jogging, walking, climbing stairs, getting dressed, ect.): 7  Over the past 3 days, what was your highest pain level?: 7  Over the past 3 days, what was your lowest pain level? : 6    Other  Was the patient's HEIGHT measured or patient reported?: Patient Reported  Was the patient's WEIGHT measured or patient reported?: Measured      Objective:        General: Radhames is well-developed, well-nourished, appears stated age, in no acute distress, alert and oriented to time, place and person.      General    Nursing note and vitals reviewed.  Constitutional: He is oriented to person, place, and time. He appears well-developed and well-nourished. No distress.   HENT:   Head: Normocephalic and atraumatic.   Nose: Nose normal.   Eyes: EOM are normal.   Cardiovascular: Normal rate, regular rhythm and intact distal pulses.    Pulmonary/Chest: Effort normal. No respiratory distress.   Neurological: He is alert and oriented to person, place, and time.   Psychiatric: He has a normal mood and affect. His behavior is normal. Judgment and thought content normal.     General Musculoskeletal Exam   Gait: abnormal and antalgic       Right Knee Exam     Inspection   Erythema: absent  Scars: present  Swelling: present  Effusion: present  Deformity: absent  Bruising: absent    Range of Motion   Extension: 0   Flexion: 110     Other   Sensation: normal    Comments:  Incisions are well healed with no surrounding erythema or warmth.    Moderate to significant quadriceps atrophy.    Moderate effusion.  Able to perform straight leg raise with no extensor lag.    Negative Lachman's    Vascular Exam     Right Pulses  Dorsalis Pedis:      2+  Posterior Tibial:      2+        Imaging:  X-rays of the right knee obtained 03/10/2022 and personally reviewed by me on that day.  These include nonweightbearing AP and lateral.  There is evidence of his prior ACL reconstructions.  There are 2 suspensory fixation buttons on the lateral cortex of the femur, 1 from the more recent surgery and 1 from his prior surgery.  Additionally, there is an interference screw in the tibial tunnel.  These are unchanged since surgery with no noted complications.  Likely osteochondroma present on the lateral cortex of the femur.            Assessment:     Radhames Jonas is a 26 y.o. male 6 weeks status post above and doing well.  Encounter Diagnoses   Name Primary?    Right knee pain, unspecified chronicity Yes    S/P ACL reconstruction            Plan:       He will continue physical therapy per protocol.  He will emphasize quadriceps strengthening.  He will return to clinic in 6 weeks for repeat evaluation.    All of their questions were answered.  They will call the clinic with any questions or concerns in the interim.    Should the patient's symptoms worsen, persist, or fail to improve they should return for reevaluation and I would be happy to see them back anytime.        Dorian Carty M.D.     Please be aware that this note has been generated with the assistance of [employer] voice-to-text.  Please excuse any spelling or grammatical errors.    Thank you for choosing Dr. Dorian Carty for your sports medicine care. It is our goal to provide you with exceptional care that will help keep you healthy, active, and get you back in the game.     If you felt that you received exemplary care today, please consider leaving feedback for Dr. Carty on [email or web address] at [email or web address].    Please do not hesitate to reach out to us via email, phone, or MyChart with any questions, concerns, or feedback.                  Patient questionnaires may have been collected.

## 2022-03-17 ENCOUNTER — TELEPHONE (OUTPATIENT)
Dept: REHABILITATION | Facility: HOSPITAL | Age: 27
End: 2022-03-17
Payer: MEDICAID

## 2022-04-27 ENCOUNTER — HOSPITAL ENCOUNTER (EMERGENCY)
Facility: HOSPITAL | Age: 27
Discharge: HOME | End: 2022-04-27
Attending: EMERGENCY MEDICINE
Payer: MEDICAID

## 2022-04-27 VITALS
OXYGEN SATURATION: 99 % | WEIGHT: 233 LBS | DIASTOLIC BLOOD PRESSURE: 68 MMHG | BODY MASS INDEX: 36.57 KG/M2 | TEMPERATURE: 98 F | HEART RATE: 79 BPM | RESPIRATION RATE: 19 BRPM | SYSTOLIC BLOOD PRESSURE: 131 MMHG | HEIGHT: 67 IN

## 2022-04-27 DIAGNOSIS — T14.90XA INJURY: ICD-10-CM

## 2022-04-27 DIAGNOSIS — M79.675 GREAT TOE PAIN, LEFT: Primary | ICD-10-CM

## 2022-04-27 DIAGNOSIS — S90.212A SUBUNGUAL HEMATOMA OF GREAT TOE OF LEFT FOOT, INITIAL ENCOUNTER: ICD-10-CM

## 2022-04-27 PROCEDURE — 25000003 PHARM REV CODE 250: Mod: ER | Performed by: EMERGENCY MEDICINE

## 2022-04-27 PROCEDURE — 11740 EVACUATION SUBUNGUAL HMTMA: CPT | Mod: LT,ER

## 2022-04-27 PROCEDURE — 99284 EMERGENCY DEPT VISIT MOD MDM: CPT | Mod: 25,ER

## 2022-04-27 RX ORDER — MUPIROCIN 20 MG/G
OINTMENT TOPICAL 2 TIMES DAILY
Qty: 22 G | Refills: 0 | Status: SHIPPED | OUTPATIENT
Start: 2022-04-27 | End: 2022-05-07

## 2022-04-27 RX ORDER — KETOROLAC TROMETHAMINE 10 MG/1
10 TABLET, FILM COATED ORAL
Status: COMPLETED | OUTPATIENT
Start: 2022-04-27 | End: 2022-04-27

## 2022-04-27 RX ORDER — IBUPROFEN 800 MG/1
800 TABLET ORAL EVERY 6 HOURS PRN
Qty: 20 TABLET | Refills: 0 | OUTPATIENT
Start: 2022-04-27 | End: 2023-07-03

## 2022-04-27 RX ADMIN — KETOROLAC TROMETHAMINE 10 MG: 10 TABLET, FILM COATED ORAL at 06:04

## 2022-04-27 NOTE — Clinical Note
"Rachel Kenny" Sumi was seen and treated in our emergency department on 4/27/2022.  He may return to work on 04/29/2022.       If you have any questions or concerns, please don't hesitate to call.      easton FELICIANO    "

## 2022-04-27 NOTE — ED PROVIDER NOTES
Encounter Date: 4/27/2022       History     Chief Complaint   Patient presents with    Foot Injury     Reports dropping a dresser drawer on his left great toe yesterday at 1900 last night. Reports taking OTC ibuprofen 400 mg with no relief     26 y.o. male with no known medical problems presents emergency department complaining of acute left great toe pain sustained around 730 p.m. yesterday after dropping a dresser drawer on his toe.  Denies weakness or wound but reports pain swelling around the toenail.  Last tetanus 2019.  Took ibuprofen prior to arrival without improvement.  Denies injury to other areas.      The history is provided by the patient.     Review of patient's allergies indicates:  No Known Allergies  History reviewed. No pertinent past medical history.  Past Surgical History:   Procedure Laterality Date    ANTERIOR CRUCIATE LIGAMENT REPAIR Right 01/2022    ARTHROSCOPY OF KNEE Right 09/17/2021    Procedure: ARTHROSCOPY, KNEE ACL debridment with bone grafting;  Surgeon: Dorian Carty MD;  Location: [facility] OR;  Service: Orthopedics;  Laterality: Right;  no block    CIRCUMCISION, PRIMARY      HARDWARE REMOVAL Right 09/17/2021    Procedure: REMOVAL, HARDWARE;  Surgeon: Dorian Carty MD;  Location: [facility] OR;  Service: Orthopedics;  Laterality: Right;  no block    KNEE ARTHROSCOPY W/ ACL RECONSTRUCTION Right 01/12/2022    Procedure: REVISION RECONSTRUCTION, KNEE, ACL, ARTHROSCOPIC, WITH QUAD TENDON AUTOGRAFT;  Surgeon: Dorian Carty MD;  Location: [facility] OR;  Service: Orthopedics;  Laterality: Right;  regional with catheter     Family History   Problem Relation Age of Onset    Hypertension Mother      Social History     Tobacco Use    Smoking status: Current Every Day Smoker     Types: Cigars    Smokeless tobacco: Never Used   Substance Use Topics    Alcohol use: No    Drug use: Yes     Types: Marijuana     Comment: 2 x a week     Review of Systems   Musculoskeletal: Positive for  arthralgias and joint swelling. Negative for gait problem.   Skin: Positive for color change. Negative for wound.   Neurological: Negative for weakness and numbness.   All other systems reviewed and are negative.      Physical Exam     Initial Vitals [22 0313]   BP Pulse Resp Temp SpO2   (!) 159/135 74 20 98.1 °F (36.7 °C) 98 %      MAP       --         Physical Exam    Nursing note and vitals reviewed.  Constitutional: He appears well-developed and well-nourished. He is not diaphoretic. No distress.   HENT:   Head: Normocephalic and atraumatic.   Mouth/Throat: Oropharynx is clear and moist.   Eyes: Conjunctivae are normal.   Neck: Phonation normal. No stridor present.   Normal range of motion.  Cardiovascular: Regular rhythm and intact distal pulses.   Pulmonary/Chest: Effort normal. No accessory muscle usage or stridor. No tachypnea. No respiratory distress.   Abdominal: He exhibits no distension. There is no abdominal tenderness.   Musculoskeletal:         General: Normal range of motion.      Cervical back: Normal range of motion.      Left foot: Normal range of motion and normal capillary refill. Tenderness and bony tenderness present. No deformity or laceration. Normal pulse.        Feet:      Neurological: He is alert and oriented to person, place, and time. He has normal strength. Gait normal. GCS eye subscore is 4. GCS verbal subscore is 5. GCS motor subscore is 6.   Skin: Skin is warm and intact. Capillary refill takes less than 2 seconds.   Psychiatric: He has a normal mood and affect.         ED Course   I & D - Incision and Drainage    Date/Time: 2022 6:00 AM  Location procedure was performed: [facility] EMERGENCY DEPARTMENT  Performed by: Socrates Franco MD  Authorized by: Socrates Franco MD   Consent Done: Yes  Consent: Verbal consent obtained.  Consent given by: patient  Patient understanding: patient states understanding of the procedure being performed  Patient identity confirmed:  and  name  Type: subungual hematoma  Body area: lower extremity  Location details: left big toe    Patient sedated: no  Scalpel size: nail trephinator.  Incision type: single straight  Complexity: simple  Drainage: bloody  Drainage amount: moderate  Wound treatment: incision,  expression of material and  drainage  Complications: No  Patient tolerance: Patient tolerated the procedure well with no immediate complications  Comments: Neurovascular status intact before and after procedure done.        Labs Reviewed - No data to display       Imaging Results          X-Ray Foot Complete Left (Final result)  Result time 04/27/22 03:54:08    Final result by Chao Borjas MD (04/27/22 03:54:08)                 Impression:      Soft tissue findings about the great toe that may relate to the history of injury, there may be some minimal air within the soft tissues, correlation for laceration is needed, there is no evidence for radiopaque foreign body.    The osseous structures demonstrate mild chronic appearing change without evidence for acute fracture or dislocation.  Clinical and historical correlation is needed to determine need for additional follow-up.      Electronically signed by: Chao Borjas  Date:    04/27/2022  Time:    03:54             Narrative:    EXAMINATION:  XR FOOT COMPLETE 3 VIEW LEFT    CLINICAL HISTORY:  .  Injury, unspecified, initial encounter    TECHNIQUE:  AP, lateral and oblique views of the left foot were performed.    COMPARISON:  None    FINDINGS:  There is appearance of soft tissue swelling about the great toe, 1st digit, this appears more prominent medially overlying the distal interphalangeal joint.  Just proximal to this within the soft tissues medial to the distal aspect of the proximal phalanx of the great toe there is subtle area of diminished attenuation that could relate to mild air within the soft tissues, correlation for laceration is needed.  There is no radiographically detectable  radiopaque foreign body.    The osseous structures demonstrate appearance of mild chronic change, there is no evidence for acute fracture or dislocation.                                 Medications   ketorolac tablet 10 mg (10 mg Oral Given 4/27/22 0626)                        Labs Reviewed       Imaging Reviewed    Imaging Results          X-Ray Foot Complete Left (Final result)  Result time 04/27/22 03:54:08    Final result by Caho Borjas MD (04/27/22 03:54:08)                 Impression:      Soft tissue findings about the great toe that may relate to the history of injury, there may be some minimal air within the soft tissues, correlation for laceration is needed, there is no evidence for radiopaque foreign body.    The osseous structures demonstrate mild chronic appearing change without evidence for acute fracture or dislocation.  Clinical and historical correlation is needed to determine need for additional follow-up.      Electronically signed by: Chao Borjas  Date:    04/27/2022  Time:    03:54             Narrative:    EXAMINATION:  XR FOOT COMPLETE 3 VIEW LEFT    CLINICAL HISTORY:  .  Injury, unspecified, initial encounter    TECHNIQUE:  AP, lateral and oblique views of the left foot were performed.    COMPARISON:  None    FINDINGS:  There is appearance of soft tissue swelling about the great toe, 1st digit, this appears more prominent medially overlying the distal interphalangeal joint.  Just proximal to this within the soft tissues medial to the distal aspect of the proximal phalanx of the great toe there is subtle area of diminished attenuation that could relate to mild air within the soft tissues, correlation for laceration is needed.  There is no radiographically detectable radiopaque foreign body.    The osseous structures demonstrate appearance of mild chronic change, there is no evidence for acute fracture or dislocation.                                Medications given in  "ED    Medications   ketorolac tablet 10 mg (10 mg Oral Given 22 06)       Note was created using voice recognition software. Note may have occasional typographical errors that may not have been identified and edited despite good chitra initial review prior to signing.  Vitals:    22 0313 22 0637   BP: (!) 159/135 131/68   BP Location: Right arm    Patient Position: Sitting    Pulse: 74 79   Resp: 20 19   Temp: 98.1 °F (36.7 °C)    TempSrc: Oral    SpO2: 98% 99%   Weight: 105.7 kg (233 lb)    Height: 5' 7" (1.702 m)        Clinical Impression:   Final diagnoses:  [T14.90XA] Injury  [M79.675] Great toe pain, left (Primary)  [S90.212A] Subungual hematoma of great toe of left foot, initial encounter          ED Disposition Condition    Discharge Stable        ED Prescriptions     Medication Sig Dispense Start Date End Date Auth. Provider    ibuprofen (ADVIL,MOTRIN) 800 MG tablet Take 1 tablet (800 mg total) by mouth every 6 (six) hours as needed for Pain. 20 tablet 2022  Socrates Franco MD    mupirocin (BACTROBAN) 2 % ointment () Apply topically 2 (two) times daily. for 10 days 22 g 2022 Socrates Franco MD        Follow-up Information     Follow up With Specialties Details Why Contact Info Additional Information    Your PCP  Call  As needed, for ongoing care      The nearest emergency department.  Go to  As needed, If symptoms worsen      [facility] Call today to schedule an appointment, for re-evaluation of today's complaint [address] [id number] [phone] 1st Floor           Socrates Franco MD  22 0128    "

## 2022-06-13 ENCOUNTER — PATIENT MESSAGE (OUTPATIENT)
Dept: SPORTS MEDICINE | Facility: CLINIC | Age: 27
End: 2022-06-13
Payer: MEDICAID

## 2022-06-16 ENCOUNTER — OFFICE VISIT (OUTPATIENT)
Dept: SPORTS MEDICINE | Facility: CLINIC | Age: 27
End: 2022-06-16
Payer: MEDICAID

## 2022-06-16 VITALS
DIASTOLIC BLOOD PRESSURE: 83 MMHG | HEART RATE: 89 BPM | SYSTOLIC BLOOD PRESSURE: 128 MMHG | HEIGHT: 67 IN | BODY MASS INDEX: 36.1 KG/M2 | WEIGHT: 230 LBS

## 2022-06-16 DIAGNOSIS — Z98.890 S/P ACL RECONSTRUCTION: Primary | ICD-10-CM

## 2022-06-16 PROCEDURE — 99213 OFFICE O/P EST LOW 20 MIN: CPT | Mod: S$PBB,,, | Performed by: STUDENT IN AN ORGANIZED HEALTH CARE EDUCATION/TRAINING PROGRAM

## 2022-06-16 PROCEDURE — 3079F PR MOST RECENT DIASTOLIC BLOOD PRESSURE 80-89 MM HG: ICD-10-PCS | Mod: CPTII,,, | Performed by: STUDENT IN AN ORGANIZED HEALTH CARE EDUCATION/TRAINING PROGRAM

## 2022-06-16 PROCEDURE — 1159F MED LIST DOCD IN RCRD: CPT | Mod: CPTII,,, | Performed by: STUDENT IN AN ORGANIZED HEALTH CARE EDUCATION/TRAINING PROGRAM

## 2022-06-16 PROCEDURE — 1160F RVW MEDS BY RX/DR IN RCRD: CPT | Mod: CPTII,,, | Performed by: STUDENT IN AN ORGANIZED HEALTH CARE EDUCATION/TRAINING PROGRAM

## 2022-06-16 PROCEDURE — 99214 OFFICE O/P EST MOD 30 MIN: CPT | Mod: PBBFAC | Performed by: STUDENT IN AN ORGANIZED HEALTH CARE EDUCATION/TRAINING PROGRAM

## 2022-06-16 PROCEDURE — 1159F PR MEDICATION LIST DOCUMENTED IN MEDICAL RECORD: ICD-10-PCS | Mod: CPTII,,, | Performed by: STUDENT IN AN ORGANIZED HEALTH CARE EDUCATION/TRAINING PROGRAM

## 2022-06-16 PROCEDURE — 3074F PR MOST RECENT SYSTOLIC BLOOD PRESSURE < 130 MM HG: ICD-10-PCS | Mod: CPTII,,, | Performed by: STUDENT IN AN ORGANIZED HEALTH CARE EDUCATION/TRAINING PROGRAM

## 2022-06-16 PROCEDURE — 99213 PR OFFICE/OUTPT VISIT, EST, LEVL III, 20-29 MIN: ICD-10-PCS | Mod: S$PBB,,, | Performed by: STUDENT IN AN ORGANIZED HEALTH CARE EDUCATION/TRAINING PROGRAM

## 2022-06-16 PROCEDURE — 99999 PR PBB SHADOW E&M-EST. PATIENT-LVL IV: CPT | Mod: PBBFAC,,, | Performed by: STUDENT IN AN ORGANIZED HEALTH CARE EDUCATION/TRAINING PROGRAM

## 2022-06-16 PROCEDURE — 3074F SYST BP LT 130 MM HG: CPT | Mod: CPTII,,, | Performed by: STUDENT IN AN ORGANIZED HEALTH CARE EDUCATION/TRAINING PROGRAM

## 2022-06-16 PROCEDURE — 3008F PR BODY MASS INDEX (BMI) DOCUMENTED: ICD-10-PCS | Mod: CPTII,,, | Performed by: STUDENT IN AN ORGANIZED HEALTH CARE EDUCATION/TRAINING PROGRAM

## 2022-06-16 PROCEDURE — 3079F DIAST BP 80-89 MM HG: CPT | Mod: CPTII,,, | Performed by: STUDENT IN AN ORGANIZED HEALTH CARE EDUCATION/TRAINING PROGRAM

## 2022-06-16 PROCEDURE — 1160F PR REVIEW ALL MEDS BY PRESCRIBER/CLIN PHARMACIST DOCUMENTED: ICD-10-PCS | Mod: CPTII,,, | Performed by: STUDENT IN AN ORGANIZED HEALTH CARE EDUCATION/TRAINING PROGRAM

## 2022-06-16 PROCEDURE — 3008F BODY MASS INDEX DOCD: CPT | Mod: CPTII,,, | Performed by: STUDENT IN AN ORGANIZED HEALTH CARE EDUCATION/TRAINING PROGRAM

## 2022-06-16 PROCEDURE — 99999 PR PBB SHADOW E&M-EST. PATIENT-LVL IV: ICD-10-PCS | Mod: PBBFAC,,, | Performed by: STUDENT IN AN ORGANIZED HEALTH CARE EDUCATION/TRAINING PROGRAM

## 2022-06-16 NOTE — PROGRESS NOTES
Subjective:          Chief Complaint: Rachel Jonas is a 26 y.o. male who had concerns including Pain of the Right Knee.    Pain  Pertinent negatives include no joint swelling or myalgias.     Patient is here today s/p right ACL reconstruction for 5 month post operative evaluation. He states that his pain is a 7/10 at its worst and locates the pain as over the distal quad as well as over the anterior proximal tiba. He notes that he has stopped physical therapy about 2 months ago due to loss of transportation and family personal events. He notes that he has started jogging with no issues. Denies any sensation of instability. He states that he performes a HEP regularly.  He does note that his leg is weak.  He states he has limited his activities as he is hesitant to advance his rehab in fear of re-injury.    Procedure(s) (LRB) with Dorian Carty MD on 01/12/2022:  REVISION RECONSTRUCTION, KNEE, ACL, ARTHROSCOPIC, WITH QUAD TENDON AUTOGRAFT (Right)  History reviewed. No pertinent past medical history.    Current Outpatient Medications on File Prior to Visit   Medication Sig Dispense Refill    (Magic mouthwash) 1:1:1 Benadryl 12.5mg/5ml liq, aluminum & magnesium hydroxide-simehticone (Maalox), LIDOcaine viscous 2% Swish and spit 10 mLs every 4 (four) hours as needed (As needed for sore throat). for mouth sores (Patient not taking: No sig reported) 90 mL 0    acetaminophen (TYLENOL) 500 MG tablet Take 2 tablets (1,000 mg total) by mouth every 6 (six) hours as needed for Pain. (Patient not taking: No sig reported) 30 tablet 0    aspirin (ECOTRIN) 81 MG EC tablet Take 1 tablet (81 mg total) by mouth once daily. (Patient not taking: Reported on 1/27/2022) 28 tablet 0    celecoxib (CELEBREX) 200 MG capsule Take 1 capsule (200 mg total) by mouth 2 (two) times daily with meals. (Patient not taking: Reported on 6/16/2022) 60 capsule 0    HYDROcodone-acetaminophen (NORCO) 5-325 mg per tablet Take 1 tablet by  mouth every 6 (six) hours as needed for Pain. (Patient not taking: Reported on 6/16/2022) 21 tablet 0    ibuprofen (ADVIL,MOTRIN) 800 MG tablet Take 1 tablet (800 mg total) by mouth every 6 (six) hours as needed for Pain. (Patient not taking: Reported on 6/16/2022) 20 tablet 0    methocarbamoL (ROBAXIN) 500 MG Tab Take 1 tablet (500 mg total) by mouth 3 (three) times daily as needed (muscle spasms). (Patient not taking: Reported on 6/16/2022) 30 tablet 0    ondansetron (ZOFRAN-ODT) 4 MG TbDL Dissolve 1 tablet (4 mg total) by mouth every 8 (eight) hours as needed (Nausea). (Patient not taking: No sig reported) 20 tablet 0    promethazine (PHENERGAN) 25 MG tablet Take 1 tablet (25 mg total) by mouth every 6 (six) hours as needed for Nausea. (Patient not taking: No sig reported) 30 tablet 0    tadalafiL (CIALIS) 5 MG tablet Take 1 tablet (5 mg total) by mouth daily as needed for Erectile Dysfunction. (Patient not taking: No sig reported) 30 tablet 11    traMADoL (ULTRAM) 50 mg tablet Take 1 tablet (50 mg total) by mouth every 6 (six) hours. (Patient not taking: No sig reported) 31 tablet 0     No current facility-administered medications on file prior to visit.       Past Surgical History:   Procedure Laterality Date    ANTERIOR CRUCIATE LIGAMENT REPAIR Right 01/2022    ARTHROSCOPY OF KNEE Right 09/17/2021    Procedure: ARTHROSCOPY, KNEE ACL debridment with bone grafting;  Surgeon: Dorian Carty MD;  Location: [facility] OR;  Service: Orthopedics;  Laterality: Right;  no block    CIRCUMCISION, PRIMARY      HARDWARE REMOVAL Right 09/17/2021    Procedure: REMOVAL, HARDWARE;  Surgeon: Dorian Carty MD;  Location: [facility] OR;  Service: Orthopedics;  Laterality: Right;  no block    KNEE ARTHROSCOPY W/ ACL RECONSTRUCTION Right 01/12/2022    Procedure: REVISION RECONSTRUCTION, KNEE, ACL, ARTHROSCOPIC, WITH QUAD TENDON AUTOGRAFT;  Surgeon: Dorian Carty MD;  Location: [facility] OR;  Service: Orthopedics;  Laterality:  Right;  regional with catheter       Family History   Problem Relation Age of Onset    Hypertension Mother        Social History     Socioeconomic History    Marital status: Single   Tobacco Use    Smoking status: Current Every Day Smoker     Types: Cigars    Smokeless tobacco: Never Used   Substance and Sexual Activity    Alcohol use: No    Drug use: Yes     Types: Marijuana     Comment: 2 x a week    Sexual activity: Yes     Partners: Female   Social History Narrative    ** Merged History Encounter **          Review of Systems   Constitutional: Negative.   HENT: Negative.    Eyes: Negative.    Cardiovascular: Negative.    Respiratory: Negative.    Endocrine: Negative.    Hematologic/Lymphatic: Negative.    Skin: Negative.    Musculoskeletal: Positive for joint pain and muscle weakness. Negative for arthritis, falls, joint swelling, myalgias and stiffness.   Neurological: Negative.    Psychiatric/Behavioral: Negative.    Allergic/Immunologic: Negative.        Pain Related Questions  Over the past 3 days, what was your average pain during activity? (I.e. running, jogging, walking, climbing stairs, getting dressed, ect.): 7  Over the past 3 days, what was your highest pain level?: 7  Over the past 3 days, what was your lowest pain level? : 7    Other  Was the patient's HEIGHT measured or patient reported?: Patient Reported  Was the patient's WEIGHT measured or patient reported?: Measured      Objective:        General: Rachel is well-developed, well-nourished, appears stated age, in no acute distress, alert and oriented to time, place and person.     General    Nursing note and vitals reviewed.  Constitutional: He is oriented to person, place, and time. He appears well-developed and well-nourished. No distress.   HENT:   Head: Normocephalic and atraumatic.   Nose: Nose normal.   Eyes: EOM are normal.   Cardiovascular: Intact distal pulses.    Pulmonary/Chest: Effort normal. No respiratory distress.    Neurological: He is alert and oriented to person, place, and time.   Psychiatric: He has a normal mood and affect. His behavior is normal. Judgment and thought content normal.     General Musculoskeletal Exam   Gait: normal       Right Knee Exam     Inspection   Erythema: absent  Scars: present  Swelling: absent  Effusion: absent  Deformity: absent  Bruising: absent    Tenderness   Right knee tenderness location: Mild tenderness over the incision.    Range of Motion   Extension: 0   Flexion: 130     Tests   Ligament Examination Lachman: normal (-1 to 2mm) PCL-Posterior Drawer: normal (0 to 2mm)     MCL - Valgus: normal (0 to 2mm)  LCL - Varus: normal  Patella   Patellar Grind: negative    Other   Sensation: normal    Comments:  Incisions are well healed with no surrounding erythema or warmth.    Moderate quadriceps atrophy  Negative Lachman's    Left Knee Exam     Range of Motion   Extension: -5   Flexion: 140     Muscle Strength   Right Lower Extremity   Quadriceps:  4/5   Hamstrin/5     Vascular Exam     Right Pulses  Dorsalis Pedis:      2+  Posterior Tibial:      2+        Imaging:  X-rays of the right knee obtained 03/10/2022 and personally reviewed by me on that day.  These include nonweightbearing AP and lateral.  There is evidence of his prior ACL reconstructions.  There are 2 suspensory fixation buttons on the lateral cortex of the femur, 1 from the more recent surgery and 1 from his prior surgery.  Additionally, there is an interference screw in the tibial tunnel.  These are unchanged since surgery with no noted complications.  Likely osteochondroma present on the lateral cortex of the femur.            Assessment:     Rachel Jonas is a 26 y.o. male 5 months status post above and doing okay.  Encounter Diagnosis   Name Primary?    S/P ACL reconstruction Yes          Plan:       We will have him return to physical therapy with a take emphasis quadriceps strengthening.  Blood flow  restriction therapy is encouraged.  Return to clinic in 2 months for re-evaluation.        Dorian Carty M.D.     Please be aware that this note has been generated with the assistance of [employer] voice-to-text.  Please excuse any spelling or grammatical errors.    Thank you for choosing Dr. Dorian Carty for your sports medicine care. It is our goal to provide you with exceptional care that will help keep you healthy, active, and get you back in the game.     If you felt that you received exemplary care today, please consider leaving feedback for Dr. Carty on [facility] at [email or web address].    Please do not hesitate to reach out to us via email, phone, or MyChart with any questions, concerns, or feedback.                  Patient questionnaires may have been collected.

## 2022-08-04 ENCOUNTER — OFFICE VISIT (OUTPATIENT)
Dept: SPORTS MEDICINE | Facility: CLINIC | Age: 27
End: 2022-08-04
Payer: MEDICAID

## 2022-08-04 VITALS
WEIGHT: 234 LBS | DIASTOLIC BLOOD PRESSURE: 78 MMHG | HEIGHT: 67 IN | HEART RATE: 85 BPM | SYSTOLIC BLOOD PRESSURE: 126 MMHG | BODY MASS INDEX: 36.73 KG/M2

## 2022-08-04 DIAGNOSIS — M25.461 EFFUSION OF RIGHT KNEE JOINT: ICD-10-CM

## 2022-08-04 DIAGNOSIS — Z98.890 S/P ACL RECONSTRUCTION: Primary | ICD-10-CM

## 2022-08-04 PROCEDURE — 99214 PR OFFICE/OUTPT VISIT, EST, LEVL IV, 30-39 MIN: ICD-10-PCS | Mod: S$PBB,,, | Performed by: STUDENT IN AN ORGANIZED HEALTH CARE EDUCATION/TRAINING PROGRAM

## 2022-08-04 PROCEDURE — 3074F PR MOST RECENT SYSTOLIC BLOOD PRESSURE < 130 MM HG: ICD-10-PCS | Mod: CPTII,,, | Performed by: STUDENT IN AN ORGANIZED HEALTH CARE EDUCATION/TRAINING PROGRAM

## 2022-08-04 PROCEDURE — 1160F PR REVIEW ALL MEDS BY PRESCRIBER/CLIN PHARMACIST DOCUMENTED: ICD-10-PCS | Mod: CPTII,,, | Performed by: STUDENT IN AN ORGANIZED HEALTH CARE EDUCATION/TRAINING PROGRAM

## 2022-08-04 PROCEDURE — 1159F PR MEDICATION LIST DOCUMENTED IN MEDICAL RECORD: ICD-10-PCS | Mod: CPTII,,, | Performed by: STUDENT IN AN ORGANIZED HEALTH CARE EDUCATION/TRAINING PROGRAM

## 2022-08-04 PROCEDURE — 3074F SYST BP LT 130 MM HG: CPT | Mod: CPTII,,, | Performed by: STUDENT IN AN ORGANIZED HEALTH CARE EDUCATION/TRAINING PROGRAM

## 2022-08-04 PROCEDURE — 99214 OFFICE O/P EST MOD 30 MIN: CPT | Mod: S$PBB,,, | Performed by: STUDENT IN AN ORGANIZED HEALTH CARE EDUCATION/TRAINING PROGRAM

## 2022-08-04 PROCEDURE — 3078F DIAST BP <80 MM HG: CPT | Mod: CPTII,,, | Performed by: STUDENT IN AN ORGANIZED HEALTH CARE EDUCATION/TRAINING PROGRAM

## 2022-08-04 PROCEDURE — 99213 OFFICE O/P EST LOW 20 MIN: CPT | Mod: PBBFAC | Performed by: STUDENT IN AN ORGANIZED HEALTH CARE EDUCATION/TRAINING PROGRAM

## 2022-08-04 PROCEDURE — 99999 PR PBB SHADOW E&M-EST. PATIENT-LVL III: ICD-10-PCS | Mod: PBBFAC,,, | Performed by: STUDENT IN AN ORGANIZED HEALTH CARE EDUCATION/TRAINING PROGRAM

## 2022-08-04 PROCEDURE — 3008F BODY MASS INDEX DOCD: CPT | Mod: CPTII,,, | Performed by: STUDENT IN AN ORGANIZED HEALTH CARE EDUCATION/TRAINING PROGRAM

## 2022-08-04 PROCEDURE — 3078F PR MOST RECENT DIASTOLIC BLOOD PRESSURE < 80 MM HG: ICD-10-PCS | Mod: CPTII,,, | Performed by: STUDENT IN AN ORGANIZED HEALTH CARE EDUCATION/TRAINING PROGRAM

## 2022-08-04 PROCEDURE — 99999 PR PBB SHADOW E&M-EST. PATIENT-LVL III: CPT | Mod: PBBFAC,,, | Performed by: STUDENT IN AN ORGANIZED HEALTH CARE EDUCATION/TRAINING PROGRAM

## 2022-08-04 PROCEDURE — 3008F PR BODY MASS INDEX (BMI) DOCUMENTED: ICD-10-PCS | Mod: CPTII,,, | Performed by: STUDENT IN AN ORGANIZED HEALTH CARE EDUCATION/TRAINING PROGRAM

## 2022-08-04 PROCEDURE — 1160F RVW MEDS BY RX/DR IN RCRD: CPT | Mod: CPTII,,, | Performed by: STUDENT IN AN ORGANIZED HEALTH CARE EDUCATION/TRAINING PROGRAM

## 2022-08-04 PROCEDURE — 1159F MED LIST DOCD IN RCRD: CPT | Mod: CPTII,,, | Performed by: STUDENT IN AN ORGANIZED HEALTH CARE EDUCATION/TRAINING PROGRAM

## 2022-08-04 NOTE — PROGRESS NOTES
Subjective:          Chief Complaint: Rachel Jonas is a 26 y.o. male who had concerns including ACL (Right ).    Pain  Associated symptoms include joint swelling. Pertinent negatives include no myalgias.     Rachel Jonas is a 26 y.o. male 7 months status post below.  He was doing very well until about 3 weeks ago when he began experiencing pain.  He said he was doing squats with physical therapy and he felt the pop within his knee which was followed by pain.  This was then followed by swelling.  He has not return to physical therapy since.  Endorses some feelings of instability with occasional buckling of his knee.  Denies any mechanical symptoms such as catching or locking.  The pain increases when he is on his feet for an extended period of time.  It is located along the lateral and medial joint lines.  Denies any numbness or paresthesias.  He was pleased with his progress until this happened.          Procedure(s) (LRB) with Dorian Carty MD on 01/12/2022:  REVISION RECONSTRUCTION, KNEE, ACL, ARTHROSCOPIC, WITH QUAD TENDON AUTOGRAFT (Right)  History reviewed. No pertinent past medical history.    Current Outpatient Medications on File Prior to Visit   Medication Sig Dispense Refill    (Magic mouthwash) 1:1:1 Benadryl 12.5mg/5ml liq, aluminum & magnesium hydroxide-simehticone (Maalox), LIDOcaine viscous 2% Swish and spit 10 mLs every 4 (four) hours as needed (As needed for sore throat). for mouth sores (Patient not taking: No sig reported) 90 mL 0    acetaminophen (TYLENOL) 500 MG tablet Take 2 tablets (1,000 mg total) by mouth every 6 (six) hours as needed for Pain. (Patient not taking: No sig reported) 30 tablet 0    aspirin (ECOTRIN) 81 MG EC tablet Take 1 tablet (81 mg total) by mouth once daily. (Patient not taking: Reported on 1/27/2022) 28 tablet 0    celecoxib (CELEBREX) 200 MG capsule Take 1 capsule (200 mg total) by mouth 2 (two) times daily with meals. (Patient not taking: No  sig reported) 60 capsule 0    HYDROcodone-acetaminophen (NORCO) 5-325 mg per tablet Take 1 tablet by mouth every 6 (six) hours as needed for Pain. (Patient not taking: No sig reported) 21 tablet 0    ibuprofen (ADVIL,MOTRIN) 800 MG tablet Take 1 tablet (800 mg total) by mouth every 6 (six) hours as needed for Pain. (Patient not taking: No sig reported) 20 tablet 0    methocarbamoL (ROBAXIN) 500 MG Tab Take 1 tablet (500 mg total) by mouth 3 (three) times daily as needed (muscle spasms). (Patient not taking: No sig reported) 30 tablet 0    ondansetron (ZOFRAN-ODT) 4 MG TbDL Dissolve 1 tablet (4 mg total) by mouth every 8 (eight) hours as needed (Nausea). (Patient not taking: No sig reported) 20 tablet 0    promethazine (PHENERGAN) 25 MG tablet Take 1 tablet (25 mg total) by mouth every 6 (six) hours as needed for Nausea. (Patient not taking: No sig reported) 30 tablet 0    tadalafiL (CIALIS) 5 MG tablet Take 1 tablet (5 mg total) by mouth daily as needed for Erectile Dysfunction. (Patient not taking: No sig reported) 30 tablet 11    traMADoL (ULTRAM) 50 mg tablet Take 1 tablet (50 mg total) by mouth every 6 (six) hours. (Patient not taking: No sig reported) 31 tablet 0     No current facility-administered medications on file prior to visit.       Past Surgical History:   Procedure Laterality Date    ANTERIOR CRUCIATE LIGAMENT REPAIR Right 01/2022    ARTHROSCOPY OF KNEE Right 09/17/2021    Procedure: ARTHROSCOPY, KNEE ACL debridment with bone grafting;  Surgeon: Dorian Carty MD;  Location: [facility] OR;  Service: Orthopedics;  Laterality: Right;  no block    CIRCUMCISION, PRIMARY      HARDWARE REMOVAL Right 09/17/2021    Procedure: REMOVAL, HARDWARE;  Surgeon: Dorian Carty MD;  Location: [facility] OR;  Service: Orthopedics;  Laterality: Right;  no block    KNEE ARTHROSCOPY W/ ACL RECONSTRUCTION Right 01/12/2022    Procedure: REVISION RECONSTRUCTION, KNEE, ACL, ARTHROSCOPIC, WITH QUAD TENDON AUTOGRAFT;   Surgeon: Dorian Carty MD;  Location: [facility];  Service: Orthopedics;  Laterality: Right;  regional with catheter       Family History   Problem Relation Age of Onset    Hypertension Mother        Social History     Socioeconomic History    Marital status: Single   Tobacco Use    Smoking status: Current Every Day Smoker     Types: Cigars    Smokeless tobacco: Never Used   Substance and Sexual Activity    Alcohol use: No    Drug use: Yes     Types: Marijuana     Comment: 2 x a week    Sexual activity: Yes     Partners: Female   Social History Narrative    ** Merged History Encounter **          Review of Systems   Constitutional: Negative.   HENT: Negative.    Eyes: Negative.    Cardiovascular: Negative.    Respiratory: Negative.    Endocrine: Negative.    Hematologic/Lymphatic: Negative.    Skin: Negative.    Musculoskeletal: Positive for joint pain and joint swelling. Negative for arthritis, falls, muscle weakness, myalgias and stiffness.   Neurological: Negative.    Psychiatric/Behavioral: Negative.    Allergic/Immunologic: Negative.        Pain Related Questions  Over the past 3 days, what was your average pain during activity? (I.e. running, jogging, walking, climbing stairs, getting dressed, ect.): 3  Over the past 3 days, what was your highest pain level?: 2  Over the past 3 days, what was your lowest pain level? : 2    Other  How many nights a week are you awakened by your affected body part?: 4  Was the patient's HEIGHT measured or patient reported?: Patient Reported  Was the patient's WEIGHT measured or patient reported?: Measured      Objective:        General: Rachel is well-developed, well-nourished, appears stated age, in no acute distress, alert and oriented to time, place and person.     General    Nursing note and vitals reviewed.  Constitutional: He is oriented to person, place, and time. He appears well-developed and well-nourished. No distress.   HENT:   Head: Normocephalic and  atraumatic.   Nose: Nose normal.   Eyes: EOM are normal.   Cardiovascular: Intact distal pulses.    Pulmonary/Chest: Effort normal. No respiratory distress.   Neurological: He is alert and oriented to person, place, and time.   Psychiatric: He has a normal mood and affect. His behavior is normal. Judgment and thought content normal.     General Musculoskeletal Exam   Gait: abnormal and antalgic       Right Knee Exam     Inspection   Erythema: absent  Scars: present  Swelling: present  Effusion: present  Deformity: absent  Bruising: absent    Tenderness   The patient is tender to palpation of the medial joint line and lateral joint line (Mild tenderness over the incision).    Range of Motion   Extension: 0   Flexion: 120     Tests   Meniscus   Yeimi:  Medial - positive Lateral - positive  Ligament Examination   Lachman: abnormal - grade IPCL-Posterior Drawer: normal (0 to 2mm)     MCL - Valgus: normal (0 to 2mm)  LCL - Varus: normal  Patella   Patellar Grind: negative    Other   Sensation: normal    Comments:  Incisions are well healed with no surrounding erythema or warmth.      Left Knee Exam     Range of Motion   Extension: -5   Flexion: 140     Muscle Strength   Right Lower Extremity   Quadriceps:  4/5   Hamstrin/5     Vascular Exam     Right Pulses  Dorsalis Pedis:      2+  Posterior Tibial:      2+        Imaging:  X-rays of the right knee obtained 03/10/2022 and personally reviewed by me on that day.  These include nonweightbearing AP and lateral.  There is evidence of his prior ACL reconstructions.  There are 2 suspensory fixation buttons on the lateral cortex of the femur, 1 from the more recent surgery and 1 from his prior surgery.  Additionally, there is an interference screw in the tibial tunnel.  These are unchanged since surgery with no noted complications.  Likely osteochondroma present on the lateral cortex of the femur.            Assessment:     Rachel Jonas is a 26 y.o. male 7  months status post above.  He now has swelling and feelings of instability.  There was concern injury to the knee.  Encounter Diagnoses   Name Primary?    S/P ACL reconstruction Yes    Effusion of right knee joint           Plan:       We will obtain an MRI of the knee to evaluate the integrity of the ACL reconstruction as well as the mediolateral menisci.  Return to clinic after to discuss the findings and potential treatment options.      All of their questions were answered.  They will call the clinic with any questions or concerns in the interim.    Should the patient's symptoms worsen, persist, or fail to improve they should return for reevaluation and I would be happy to see them back anytime.        Dorian Carty M.D.     Please be aware that this note has been generated with the assistance of [employer] voice-to-text.  Please excuse any spelling or grammatical errors.    Thank you for choosing Dr. Dorian Carty for your sports medicine care. It is our goal to provide you with exceptional care that will help keep you healthy, active, and get you back in the game.     If you felt that you received exemplary care today, please consider leaving feedback for Dr. Carty on [employer] at [email or web address].    Please do not hesitate to reach out to us via email, phone, or MyChart with any questions, concerns, or feedback.                Patient questionnaires may have been collected.

## 2022-08-29 ENCOUNTER — HOSPITAL ENCOUNTER (OUTPATIENT)
Dept: RADIOLOGY | Facility: HOSPITAL | Age: 27
Discharge: HOME | End: 2022-08-29
Attending: STUDENT IN AN ORGANIZED HEALTH CARE EDUCATION/TRAINING PROGRAM
Payer: MEDICAID

## 2022-08-29 DIAGNOSIS — M25.461 EFFUSION OF RIGHT KNEE JOINT: ICD-10-CM

## 2022-08-29 DIAGNOSIS — Z98.890 S/P ACL RECONSTRUCTION: ICD-10-CM

## 2022-08-29 PROCEDURE — 73721 MRI KNEE WITHOUT CONTRAST RIGHT: ICD-10-PCS | Mod: 26,RT,, | Performed by: RADIOLOGY

## 2022-08-29 PROCEDURE — 73721 MRI JNT OF LWR EXTRE W/O DYE: CPT | Mod: TC,RT

## 2022-08-29 PROCEDURE — 73721 MRI JNT OF LWR EXTRE W/O DYE: CPT | Mod: 26,RT,, | Performed by: RADIOLOGY

## 2022-08-30 ENCOUNTER — TELEPHONE (OUTPATIENT)
Dept: SPORTS MEDICINE | Facility: CLINIC | Age: 27
End: 2022-08-30
Payer: MEDICAID

## 2022-08-30 NOTE — TELEPHONE ENCOUNTER
----- Message from Alice Duran sent at 8/30/2022 10:15 AM CDT -----  Regarding: regarding MRI results  Contact: PT  [phone]  Rachel Jonas MRN [id number] regarding the MRI update he just read, has some questions    Patient can be contacted @# [phone]

## 2022-08-30 NOTE — TELEPHONE ENCOUNTER
Spoke with pt and let him know that Dr. Carty will go over his MRI results with him at his appt on Thursday. Pt states understanding and appreciation.

## 2022-09-01 ENCOUNTER — TELEPHONE (OUTPATIENT)
Dept: SPORTS MEDICINE | Facility: CLINIC | Age: 27
End: 2022-09-01
Payer: MEDICAID

## 2022-09-01 NOTE — TELEPHONE ENCOUNTER
----- Message from Frida Espino sent at 9/1/2022  9:09 AM CDT -----  Regarding: Appt  Contact: [phone]  Patient Rachel is calling. Patient cant keep the appt scheduled for 9/8. Patient would like to know if he can get the results of his test over the phone. Please call patient at [phone]    Thank You

## 2022-09-01 NOTE — TELEPHONE ENCOUNTER
Spoke with pt and let him know that he needs to come in to receive his MRI results. Pt is keeping his appt for next week 9/8

## 2022-09-01 NOTE — TELEPHONE ENCOUNTER
----- Message from Maris Wagner sent at 9/1/2022  8:45 AM CDT -----  Regarding: missed appt  Type:  Sooner Apoointment Request    Caller is requesting a sooner appointment.  Caller declined first available appointment listed below.  Caller will not accept being placed on the waitlist and is requesting a message be sent to doctor.  Name of Caller:patient     When is the first available appointment?none    Symptoms:reschedule missed appt    Would the patient rather a call back or a response via [facility]? Call back     Best Call Back Number:[phone]  Additional Information: n/a

## 2022-09-08 ENCOUNTER — OFFICE VISIT (OUTPATIENT)
Dept: SPORTS MEDICINE | Facility: CLINIC | Age: 27
End: 2022-09-08
Payer: MEDICAID

## 2022-09-08 VITALS
SYSTOLIC BLOOD PRESSURE: 125 MMHG | DIASTOLIC BLOOD PRESSURE: 79 MMHG | HEIGHT: 67 IN | HEART RATE: 65 BPM | WEIGHT: 237.31 LBS | BODY MASS INDEX: 37.25 KG/M2

## 2022-09-08 DIAGNOSIS — Z98.890 S/P ACL RECONSTRUCTION: Primary | ICD-10-CM

## 2022-09-08 PROCEDURE — 3074F SYST BP LT 130 MM HG: CPT | Mod: CPTII,,, | Performed by: STUDENT IN AN ORGANIZED HEALTH CARE EDUCATION/TRAINING PROGRAM

## 2022-09-08 PROCEDURE — 99999 PR PBB SHADOW E&M-EST. PATIENT-LVL III: ICD-10-PCS | Mod: PBBFAC,,, | Performed by: STUDENT IN AN ORGANIZED HEALTH CARE EDUCATION/TRAINING PROGRAM

## 2022-09-08 PROCEDURE — 99999 PR PBB SHADOW E&M-EST. PATIENT-LVL III: CPT | Mod: PBBFAC,,, | Performed by: STUDENT IN AN ORGANIZED HEALTH CARE EDUCATION/TRAINING PROGRAM

## 2022-09-08 PROCEDURE — 1160F RVW MEDS BY RX/DR IN RCRD: CPT | Mod: CPTII,,, | Performed by: STUDENT IN AN ORGANIZED HEALTH CARE EDUCATION/TRAINING PROGRAM

## 2022-09-08 PROCEDURE — 99213 OFFICE O/P EST LOW 20 MIN: CPT | Mod: PBBFAC | Performed by: STUDENT IN AN ORGANIZED HEALTH CARE EDUCATION/TRAINING PROGRAM

## 2022-09-08 PROCEDURE — 3074F PR MOST RECENT SYSTOLIC BLOOD PRESSURE < 130 MM HG: ICD-10-PCS | Mod: CPTII,,, | Performed by: STUDENT IN AN ORGANIZED HEALTH CARE EDUCATION/TRAINING PROGRAM

## 2022-09-08 PROCEDURE — 1160F PR REVIEW ALL MEDS BY PRESCRIBER/CLIN PHARMACIST DOCUMENTED: ICD-10-PCS | Mod: CPTII,,, | Performed by: STUDENT IN AN ORGANIZED HEALTH CARE EDUCATION/TRAINING PROGRAM

## 2022-09-08 PROCEDURE — 99214 PR OFFICE/OUTPT VISIT, EST, LEVL IV, 30-39 MIN: ICD-10-PCS | Mod: S$PBB,,, | Performed by: STUDENT IN AN ORGANIZED HEALTH CARE EDUCATION/TRAINING PROGRAM

## 2022-09-08 PROCEDURE — 3078F DIAST BP <80 MM HG: CPT | Mod: CPTII,,, | Performed by: STUDENT IN AN ORGANIZED HEALTH CARE EDUCATION/TRAINING PROGRAM

## 2022-09-08 PROCEDURE — 3008F PR BODY MASS INDEX (BMI) DOCUMENTED: ICD-10-PCS | Mod: CPTII,,, | Performed by: STUDENT IN AN ORGANIZED HEALTH CARE EDUCATION/TRAINING PROGRAM

## 2022-09-08 PROCEDURE — 99214 OFFICE O/P EST MOD 30 MIN: CPT | Mod: S$PBB,,, | Performed by: STUDENT IN AN ORGANIZED HEALTH CARE EDUCATION/TRAINING PROGRAM

## 2022-09-08 PROCEDURE — 1159F MED LIST DOCD IN RCRD: CPT | Mod: CPTII,,, | Performed by: STUDENT IN AN ORGANIZED HEALTH CARE EDUCATION/TRAINING PROGRAM

## 2022-09-08 PROCEDURE — 1159F PR MEDICATION LIST DOCUMENTED IN MEDICAL RECORD: ICD-10-PCS | Mod: CPTII,,, | Performed by: STUDENT IN AN ORGANIZED HEALTH CARE EDUCATION/TRAINING PROGRAM

## 2022-09-08 PROCEDURE — 3078F PR MOST RECENT DIASTOLIC BLOOD PRESSURE < 80 MM HG: ICD-10-PCS | Mod: CPTII,,, | Performed by: STUDENT IN AN ORGANIZED HEALTH CARE EDUCATION/TRAINING PROGRAM

## 2022-09-08 PROCEDURE — 3008F BODY MASS INDEX DOCD: CPT | Mod: CPTII,,, | Performed by: STUDENT IN AN ORGANIZED HEALTH CARE EDUCATION/TRAINING PROGRAM

## 2022-09-08 NOTE — PROGRESS NOTES
Subjective:          Chief Complaint: Rachel Jonas is a 27 y.o. male who had concerns including Follow-up of the Right Knee.    Pain  Pertinent negatives include no joint swelling or myalgias.   Follow-up  Pertinent negatives include no joint swelling or myalgias.   Rachel Jonas is a 27 y.o. male nearly 8 months status post below.  He is here today for follow-up he had an MRI since his last visit, see my detailed interpretation below.  Overall he says the symptoms are improved.  He does not really have any instability to his knee anymore, occasional pains.          Procedure(s) (LRB) with Dorian Carty MD on 01/12/2022:  REVISION RECONSTRUCTION, KNEE, ACL, ARTHROSCOPIC, WITH QUAD TENDON AUTOGRAFT (Right)  History reviewed. No pertinent past medical history.    Current Outpatient Medications on File Prior to Visit   Medication Sig Dispense Refill    (Magic mouthwash) 1:1:1 Benadryl 12.5mg/5ml liq, aluminum & magnesium hydroxide-simehticone (Maalox), LIDOcaine viscous 2% Swish and spit 10 mLs every 4 (four) hours as needed (As needed for sore throat). for mouth sores (Patient not taking: No sig reported) 90 mL 0    acetaminophen (TYLENOL) 500 MG tablet Take 2 tablets (1,000 mg total) by mouth every 6 (six) hours as needed for Pain. (Patient not taking: No sig reported) 30 tablet 0    aspirin (ECOTRIN) 81 MG EC tablet Take 1 tablet (81 mg total) by mouth once daily. (Patient not taking: Reported on 1/27/2022) 28 tablet 0    celecoxib (CELEBREX) 200 MG capsule Take 1 capsule (200 mg total) by mouth 2 (two) times daily with meals. (Patient not taking: No sig reported) 60 capsule 0    HYDROcodone-acetaminophen (NORCO) 5-325 mg per tablet Take 1 tablet by mouth every 6 (six) hours as needed for Pain. (Patient not taking: No sig reported) 21 tablet 0    ibuprofen (ADVIL,MOTRIN) 800 MG tablet Take 1 tablet (800 mg total) by mouth every 6 (six) hours as needed for Pain. (Patient not taking: No sig  reported) 20 tablet 0    methocarbamoL (ROBAXIN) 500 MG Tab Take 1 tablet (500 mg total) by mouth 3 (three) times daily as needed (muscle spasms). (Patient not taking: No sig reported) 30 tablet 0    ondansetron (ZOFRAN-ODT) 4 MG TbDL Dissolve 1 tablet (4 mg total) by mouth every 8 (eight) hours as needed (Nausea). (Patient not taking: No sig reported) 20 tablet 0    promethazine (PHENERGAN) 25 MG tablet Take 1 tablet (25 mg total) by mouth every 6 (six) hours as needed for Nausea. (Patient not taking: No sig reported) 30 tablet 0    tadalafiL (CIALIS) 5 MG tablet Take 1 tablet (5 mg total) by mouth daily as needed for Erectile Dysfunction. (Patient not taking: No sig reported) 30 tablet 11    traMADoL (ULTRAM) 50 mg tablet Take 1 tablet (50 mg total) by mouth every 6 (six) hours. (Patient not taking: No sig reported) 31 tablet 0     No current facility-administered medications on file prior to visit.       Past Surgical History:   Procedure Laterality Date    ANTERIOR CRUCIATE LIGAMENT REPAIR Right 01/2022    ARTHROSCOPY OF KNEE Right 09/17/2021    Procedure: ARTHROSCOPY, KNEE ACL debridment with bone grafting;  Surgeon: Dorian Carty MD;  Location: [facility] OR;  Service: Orthopedics;  Laterality: Right;  no block    CIRCUMCISION, PRIMARY      HARDWARE REMOVAL Right 09/17/2021    Procedure: REMOVAL, HARDWARE;  Surgeon: Dorian Carty MD;  Location: [facility] OR;  Service: Orthopedics;  Laterality: Right;  no block    KNEE ARTHROSCOPY W/ ACL RECONSTRUCTION Right 01/12/2022    Procedure: REVISION RECONSTRUCTION, KNEE, ACL, ARTHROSCOPIC, WITH QUAD TENDON AUTOGRAFT;  Surgeon: Dorian Carty MD;  Location: [facility] OR;  Service: Orthopedics;  Laterality: Right;  regional with catheter       Family History   Problem Relation Age of Onset    Hypertension Mother        Social History     Socioeconomic History    Marital status: Single   Tobacco Use    Smoking status: Every Day     Types: Cigars    Smokeless tobacco: Never    Substance and Sexual Activity    Alcohol use: No    Drug use: Yes     Types: Marijuana     Comment: 2 x a week    Sexual activity: Yes     Partners: Female   Social History Narrative    ** Merged History Encounter **          Review of Systems   Constitutional: Negative.   HENT: Negative.     Eyes: Negative.    Cardiovascular: Negative.    Respiratory: Negative.     Endocrine: Negative.    Hematologic/Lymphatic: Negative.    Skin: Negative.    Musculoskeletal:  Negative for arthritis, falls, joint pain, joint swelling, muscle weakness, myalgias and stiffness.   Neurological: Negative.    Psychiatric/Behavioral: Negative.     Allergic/Immunologic: Negative.                  Objective:        General: Rachel is well-developed, well-nourished, appears stated age, in no acute distress, alert and oriented to time, place and person.     General    Nursing note and vitals reviewed.  Constitutional: He is oriented to person, place, and time. He appears well-developed and well-nourished. No distress.   HENT:   Head: Normocephalic and atraumatic.   Nose: Nose normal.   Eyes: EOM are normal.   Cardiovascular:  Intact distal pulses.            Pulmonary/Chest: Effort normal. No respiratory distress.   Neurological: He is alert and oriented to person, place, and time.   Psychiatric: He has a normal mood and affect. His behavior is normal. Judgment and thought content normal.     General Musculoskeletal Exam   Gait: abnormal and antalgic       Right Knee Exam     Inspection   Erythema: absent  Scars: present  Swelling: absent  Effusion: absent  Deformity: absent  Bruising: absent    Tenderness   The patient is experiencing no tenderness (Mild tenderness over the incision).     Range of Motion   Extension:  0   Flexion:  120     Tests   Meniscus   Yeimi:  Medial - negative Lateral - negative  Ligament Examination   Lachman: abnormal - grade I  PCL-Posterior Drawer: normal (0 to 2mm)     MCL - Valgus: normal (0 to 2mm)  LCL -  Varus: normal  Patella   Patellar Grind: negative    Other   Sensation: normal    Comments:  Incisions are well healed with no surrounding erythema or warmth.      Left Knee Exam     Range of Motion   Extension:  -5   Flexion:  140     Muscle Strength   Right Lower Extremity   Quadriceps:  4/5   Hamstrin/5     Vascular Exam     Right Pulses  Dorsalis Pedis:      2+  Posterior Tibial:      2+    Imaging:  X-rays of the right knee obtained 03/10/2022 and personally reviewed by me on that day.  These include nonweightbearing AP and lateral.  There is evidence of his prior ACL reconstructions.  There are 2 suspensory fixation buttons on the lateral cortex of the femur, 1 from the more recent surgery and 1 from his prior surgery.  Additionally, there is an interference screw in the tibial tunnel.  These are unchanged since surgery with no noted complications.  Likely osteochondroma present on the lateral cortex of the femur.    MRI of the right knee from 2022 personally reviewed by me 2022.  ACL graft is intact.  Mediolateral menisci appear intact with some possible horizontal tear that has been chronic and was previously debrided.  No noted complications.  Cartilage appears intact        Assessment:     Rachel Jonas is a 27 y.o. male 8 months status post above and doing better.  Encounter Diagnosis   Name Primary?    S/P ACL reconstruction Yes          Plan:       Ease back into physical therapy.  He will continue this.  He will return to clinic in 2 months for repeat evaluation.      All of their questions were answered.  They will call the clinic with any questions or concerns in the interim.    Should the patient's symptoms worsen, persist, or fail to improve they should return for reevaluation and I would be happy to see them back anytime.        Dorian Carty M.D.    Please be aware that this note has been generated with the assistance of [facility] voice-to-text.  Please excuse any  spelling or grammatical errors.    Thank you for choosing Dr. Dorian Carty for your sports medicine care. It is our goal to provide you with exceptional care that will help keep you healthy, active, and get you back in the game.     If you felt that you received exemplary care today, please consider leaving feedback for Dr. Carty on [employer] at [email or web address].    Please do not hesitate to reach out to us via email, phone, or MyChart with any questions, concerns, or feedback.                Patient questionnaires may have been collected.

## 2023-03-28 ENCOUNTER — PATIENT MESSAGE (OUTPATIENT)
Dept: RESEARCH | Facility: HOSPITAL | Age: 28
End: 2023-03-28
Payer: MEDICAID

## 2023-06-13 ENCOUNTER — PATIENT MESSAGE (OUTPATIENT)
Dept: RESEARCH | Facility: HOSPITAL | Age: 28
End: 2023-06-13
Payer: MEDICAID

## 2023-06-20 ENCOUNTER — PATIENT MESSAGE (OUTPATIENT)
Dept: RESEARCH | Facility: HOSPITAL | Age: 28
End: 2023-06-20
Payer: MEDICAID

## 2023-07-03 ENCOUNTER — HOSPITAL ENCOUNTER (EMERGENCY)
Facility: HOSPITAL | Age: 28
Discharge: LEFT BEFORE DISCHARGE | End: 2023-07-03
Attending: EMERGENCY MEDICINE
Payer: MEDICAID

## 2023-07-03 ENCOUNTER — TELEPHONE (OUTPATIENT)
Dept: SPORTS MEDICINE | Facility: CLINIC | Age: 28
End: 2023-07-03
Payer: MEDICAID

## 2023-07-03 VITALS
HEART RATE: 76 BPM | DIASTOLIC BLOOD PRESSURE: 84 MMHG | WEIGHT: 230 LBS | TEMPERATURE: 99 F | OXYGEN SATURATION: 99 % | BODY MASS INDEX: 36.02 KG/M2 | SYSTOLIC BLOOD PRESSURE: 142 MMHG | RESPIRATION RATE: 14 BRPM

## 2023-07-03 DIAGNOSIS — M25.561 ACUTE PAIN OF RIGHT KNEE: Primary | ICD-10-CM

## 2023-07-03 DIAGNOSIS — Z53.21 ELOPED FROM EMERGENCY DEPARTMENT: ICD-10-CM

## 2023-07-03 DIAGNOSIS — R52 PAIN: ICD-10-CM

## 2023-07-03 PROCEDURE — 99283 EMERGENCY DEPT VISIT LOW MDM: CPT | Mod: ER

## 2023-07-03 PROCEDURE — 25000003 PHARM REV CODE 250: Mod: ER | Performed by: NURSE PRACTITIONER

## 2023-07-03 RX ORDER — IBUPROFEN 600 MG/1
600 TABLET ORAL
Status: COMPLETED | OUTPATIENT
Start: 2023-07-03 | End: 2023-07-03

## 2023-07-03 RX ADMIN — IBUPROFEN 600 MG: 600 TABLET ORAL at 09:07

## 2023-07-03 NOTE — TELEPHONE ENCOUNTER
----- Message from Alexia Cade sent at 7/3/2023  6:58 AM CDT -----  Regarding: Pt called to speak to the nurse due to reinjuring his ACL and pt would like a call back today asap to be seen today  Same Day Appointment Access Request:    Pt called to speak to the nurse due to reinjuring his ACL and pt would like a call back today asap    Pt can be reached at [phone]

## 2023-07-03 NOTE — ED PROVIDER NOTES
Encounter Date: 7/3/2023    SCRIBE #1 NOTE: I, Tavia Rogers, am scribing for, and in the presence of,  DONNA Prado. I have scribed the following portions of the note - Other sections scribed: HPI; ROS; PE.     History     Chief Complaint   Patient presents with    Knee Pain     Rachel Jonas, a 27 y.o. male presents to the ED via PV with CC of R leg pain. Pt has a p,hx of torn ACL and has had multiple surgeries to the R knee, most recent last year.     27 y.o. male with no known PMH who presents to the Emergency Department with complaints of right knee pain onset 3 days ago. Patient reports he's had several ACL surgeries in the past and the last time he saw orthopedics was in September 2022. He states he has pain with ambulating. Patient notes he feels like he has to manually move his leg. He denies rash, fever, chest pain, SOB, numbness, weakness, tingling, abdominal pain, back pain, dysuria, hematuria, nausea, vomiting, diarrhea, or any other complaints. He rates his pain as 8/10 and has taken Aleve for the symptoms yesterday with no relief.  No alleviating/aggravating factors. Patient does not have any medical allergies. Patient uses tobacco and marijuana, but does not use EtOH.      The history is provided by the patient. No  was used.   Review of patient's allergies indicates:  No Known Allergies  History reviewed. No pertinent past medical history.  Past Surgical History:   Procedure Laterality Date    ANTERIOR CRUCIATE LIGAMENT REPAIR Right 01/2022    ARTHROSCOPY OF KNEE Right 09/17/2021    Procedure: ARTHROSCOPY, KNEE ACL debridment with bone grafting;  Surgeon: Dorian Carty MD;  Location: [facility] OR;  Service: Orthopedics;  Laterality: Right;  no block    CIRCUMCISION, PRIMARY      HARDWARE REMOVAL Right 09/17/2021    Procedure: REMOVAL, HARDWARE;  Surgeon: Dorian Carty MD;  Location: [facility] OR;  Service: Orthopedics;  Laterality: Right;  no block    KNEE  ARTHROSCOPY W/ ACL RECONSTRUCTION Right 01/12/2022    Procedure: REVISION RECONSTRUCTION, KNEE, ACL, ARTHROSCOPIC, WITH QUAD TENDON AUTOGRAFT;  Surgeon: Dorian Carty MD;  Location: [facility];  Service: Orthopedics;  Laterality: Right;  regional with catheter     Family History   Problem Relation Age of Onset    Hypertension Mother      Social History     Tobacco Use    Smoking status: Every Day     Types: Cigars    Smokeless tobacco: Never   Substance Use Topics    Alcohol use: No    Drug use: Yes     Types: Marijuana     Comment: 2 x a week     Review of Systems   Constitutional:  Negative for chills and fever.   HENT:  Negative for congestion, ear pain, rhinorrhea and sore throat.    Eyes:  Negative for pain, discharge and redness.   Respiratory:  Negative for cough and shortness of breath.    Cardiovascular:  Negative for chest pain.   Gastrointestinal:  Negative for abdominal pain, diarrhea, nausea and vomiting.   Genitourinary:  Negative for dysuria.   Musculoskeletal:  Positive for arthralgias (right knee). Negative for back pain, neck pain and neck stiffness.   Skin:  Negative for rash.   Neurological:  Negative for dizziness, weakness, light-headedness, numbness and headaches.   Psychiatric/Behavioral:  Negative for confusion.      Physical Exam     Initial Vitals [07/03/23 0800]   BP Pulse Resp Temp SpO2   (!) 160/98 67 18 99.1 °F (37.3 °C) 99 %      MAP       --         Physical Exam    Nursing note and vitals reviewed.  Constitutional: He appears well-developed and well-nourished. He is cooperative.  Non-toxic appearance. He does not appear ill.   HENT:   Head: Normocephalic and atraumatic.   Right Ear: External ear normal.   Left Ear: External ear normal.   Eyes: Conjunctivae and EOM are normal. Pupils are equal, round, and reactive to light.   Neck: Neck supple.   Normal range of motion.  Cardiovascular:  Normal rate and regular rhythm.           Pulses:       Dorsalis pedis pulses are 2+ on the  right side.        Posterior tibial pulses are 2+ on the right side.   Pulmonary/Chest: Effort normal and breath sounds normal.   Abdominal: Abdomen is soft. There is no abdominal tenderness. There is no rebound and no guarding.   Musculoskeletal:      Cervical back: Normal range of motion and neck supple.      Right knee: No swelling or erythema. Decreased range of motion. Tenderness present.      Instability Tests: Anterior drawer test negative. Posterior drawer test negative.      Comments: Tenderness to right knee, no swelling, erythema, rash, or obvious deformity. Decreased ROM due to pain. Normal strength and sensation. Normal gait; healed surgical scars     Neurological: He is alert and oriented to person, place, and time. No cranial nerve deficit. Gait normal. GCS eye subscore is 4. GCS verbal subscore is 5. GCS motor subscore is 6.   Skin: Skin is warm, dry and intact. Capillary refill takes less than 2 seconds. No rash noted.   Psychiatric: He has a normal mood and affect. His speech is normal and behavior is normal. Thought content normal.       ED Course   Procedures  Labs Reviewed - No data to display       Imaging Results              X-Ray Knee 3 View Right (In process)  Result time 07/03/23 10:28:35                     Medications   ibuprofen tablet 600 mg (600 mg Oral Given 7/3/23 0916)     Medical Decision Making:   History:   Old Medical Records: I decided to obtain old medical records.  Old Records Summarized: records from clinic visits, other records and records from another hospital.       <> Summary of Records: External documents reviewed.  Independently Interpreted Test(s):   I have ordered and independently interpreted X-rays - see prior notes.  Clinical Tests:   Radiological Study: Ordered and Reviewed     APC / Resident Notes:   This is an evaluation of a 27 y.o. male that presents to the Emergency Department for right knee pain.   The patient is a non-toxic, afebrile, and well appearing  male. On physical exam, there is Tenderness to right knee, no swelling, erythema, rash, or obvious deformity. Decreased ROM due to pain. Normal strength and sensation. Normal gait; healed surgical scars    Vital Signs: 160/98, 99.1, 67, 18, 99%   If available, previous records reviewed.   I ordered X-rays.  Patient eloped prior to receiving Xray results.  He told the nurse that he could not wait any longer on the Xray delay.           Scribe Attestation:   Scribe #1: I performed the above scribed service and the documentation accurately describes the services I performed. I attest to the accuracy of the note.            Scribe attestation: I, NICOL Prado, personally performed the services described in this documentation.  All medical record entries made by the scribe were at my direction and in my presence.  I have reviewed the chart and agree that the record reflects my personal performance and is accurate and complete.         Clinical Impression:   Final diagnoses:  [R52] Pain  [M25.561] Acute pain of right knee (Primary)  [Z53.21] Eloped from emergency department        ED Disposition Condition    Eloped                 DONNA Pelletier  07/03/23 1032

## 2024-02-28 ENCOUNTER — TELEPHONE (OUTPATIENT)
Dept: UROLOGY | Facility: CLINIC | Age: 29
End: 2024-02-28
Payer: MEDICAID

## 2024-02-28 NOTE — TELEPHONE ENCOUNTER
Pt was contacted [facility]  ----- Message from Kevin Jimenez sent at 2/27/2024  5:32 PM CST -----  Type: General Call Back         Name of Caller:pt  Symptoms:c/u appt  Would the patient rather a call back or a response via [facility]? call  Best Call Back Number:[phone]

## 2024-02-28 NOTE — TELEPHONE ENCOUNTER
Pt was contacted [facility]  ----- Message from Lydia Dao sent at 2/28/2024  1:43 PM CST -----  Regarding: self [phone]  Type:  Patient Returning Call    Who Called:  self     Who Left Message for Patient:  Disha    Does the patient know what this is regarding?: returned call    Would the patient rather a call back or a response via [facility]?  Call back     Best Call Back Number:[phone]

## 2025-08-17 ENCOUNTER — HOSPITAL ENCOUNTER (EMERGENCY)
Facility: HOSPITAL | Age: 30
Discharge: HOME | End: 2025-08-17
Attending: EMERGENCY MEDICINE
Payer: MEDICAID

## 2025-08-17 VITALS
RESPIRATION RATE: 18 BRPM | DIASTOLIC BLOOD PRESSURE: 75 MMHG | HEART RATE: 65 BPM | OXYGEN SATURATION: 100 % | BODY MASS INDEX: 38.45 KG/M2 | WEIGHT: 245 LBS | SYSTOLIC BLOOD PRESSURE: 119 MMHG | TEMPERATURE: 98 F | HEIGHT: 67 IN

## 2025-08-17 DIAGNOSIS — M62.82 NON-TRAUMATIC RHABDOMYOLYSIS: Primary | ICD-10-CM

## 2025-08-17 DIAGNOSIS — E86.0 DEHYDRATION: ICD-10-CM

## 2025-08-17 DIAGNOSIS — R74.8 ELEVATED CPK: ICD-10-CM

## 2025-08-17 LAB
ALBUMIN SERPL-MCNC: 3.8 G/DL (ref 3.3–5.5)
ALLENS TEST: ABNORMAL
ALP SERPL-CCNC: 55 U/L (ref 42–141)
BILIRUB SERPL-MCNC: 2.1 MG/DL (ref 0.2–1.6)
BILIRUBIN, POC UA: ABNORMAL
BLOOD, POC UA: NEGATIVE
BUN SERPL-MCNC: 9 MG/DL (ref 7–22)
CALCIUM SERPL-MCNC: 8.8 MG/DL (ref 8–10.3)
CHLORIDE SERPL-SCNC: 107 MMOL/L (ref 98–108)
CK SERPL-CCNC: 2029 U/L (ref 20–200)
CLARITY, UA: CLEAR
COLOR, UA: YELLOW
CREAT SERPL-MCNC: 1.2 MG/DL (ref 0.6–1.2)
GLUCOSE SERPL-MCNC: 116 MG/DL (ref 73–118)
GLUCOSE, POC UA: NEGATIVE
HCO3 UR-SCNC: 22.6 MMOL/L (ref 24–28)
HCT, POC: NORMAL
HGB, POC: NORMAL (ref 14–18)
KETONES, POC UA: ABNORMAL
LDH SERPL L TO P-CCNC: 1.44 MMOL/L (ref 0.5–2.2)
LEUKOCYTE EST, POC UA: NEGATIVE
MAGNESIUM SERPL-MCNC: 1.6 MG/DL (ref 1.6–2.6)
MCH, POC: NORMAL
MCHC, POC: NORMAL
MCV, POC: NORMAL
MPV, POC: NORMAL
NITRITE, POC UA: NEGATIVE
PCO2 BLDA: 36.2 MMHG (ref 35–45)
PH SMN: 7.4 [PH] (ref 7.35–7.45)
PH UR STRIP: 5.5 [PH] (ref 5–8)
PO2 BLDA: 146 MMHG (ref 40–60)
POC ALT (SGPT): 48 U/L (ref 10–47)
POC AST (SGOT): 57 U/L (ref 11–38)
POC B-TYPE NATRIURETIC PEPTIDE: 8.4 PG/ML (ref 0–100)
POC BE: -2 MMOL/L (ref -2–2)
POC CARDIAC TROPONIN I: 0 NG/ML (ref 0–0.08)
POC PLATELET COUNT: NORMAL
POC SATURATED O2: 99 % (ref 95–100)
POC TCO2: 24 MMOL/L (ref 24–29)
POC TCO2: 26 MMOL/L (ref 18–33)
POTASSIUM BLD-SCNC: 4.2 MMOL/L (ref 3.6–5.1)
PROTEIN, POC UA: ABNORMAL
PROTEIN, POC: 6 G/DL (ref 6.4–8.1)
RBC, POC: NORMAL
RDW, POC: NORMAL
SAMPLE: ABNORMAL
SAMPLE: NORMAL
SITE: ABNORMAL
SODIUM BLD-SCNC: 141 MMOL/L (ref 128–145)
SPECIFIC GRAVITY, POC UA: >=1.03 (ref 1–1.03)
UROBILINOGEN, POC UA: 1 E.U./DL
WBC, POC: NORMAL

## 2025-08-17 PROCEDURE — 93005 ELECTROCARDIOGRAM TRACING: CPT | Mod: ER

## 2025-08-17 PROCEDURE — 80053 COMPREHEN METABOLIC PANEL: CPT | Mod: ER

## 2025-08-17 PROCEDURE — 63600175 PHARM REV CODE 636 W HCPCS: Mod: ER

## 2025-08-17 PROCEDURE — 99285 EMERGENCY DEPT VISIT HI MDM: CPT | Mod: 25,ER

## 2025-08-17 PROCEDURE — 96361 HYDRATE IV INFUSION ADD-ON: CPT | Mod: ER

## 2025-08-17 PROCEDURE — 83735 ASSAY OF MAGNESIUM: CPT

## 2025-08-17 PROCEDURE — 85025 COMPLETE CBC W/AUTO DIFF WBC: CPT | Mod: ER

## 2025-08-17 PROCEDURE — 83880 ASSAY OF NATRIURETIC PEPTIDE: CPT | Mod: ER

## 2025-08-17 PROCEDURE — 96375 TX/PRO/DX INJ NEW DRUG ADDON: CPT | Mod: ER

## 2025-08-17 PROCEDURE — 84484 ASSAY OF TROPONIN QUANT: CPT | Mod: ER

## 2025-08-17 PROCEDURE — 81003 URINALYSIS AUTO W/O SCOPE: CPT | Mod: ER

## 2025-08-17 PROCEDURE — 96374 THER/PROPH/DIAG INJ IV PUSH: CPT | Mod: ER

## 2025-08-17 PROCEDURE — 82550 ASSAY OF CK (CPK): CPT

## 2025-08-17 PROCEDURE — 82803 BLOOD GASES ANY COMBINATION: CPT | Mod: ER

## 2025-08-17 PROCEDURE — 25000003 PHARM REV CODE 250: Mod: ER

## 2025-08-17 RX ORDER — SODIUM CHLORIDE 9 MG/ML
1000 INJECTION, SOLUTION INTRAVENOUS
Status: COMPLETED | OUTPATIENT
Start: 2025-08-17 | End: 2025-08-17

## 2025-08-17 RX ORDER — KETOROLAC TROMETHAMINE 30 MG/ML
15 INJECTION, SOLUTION INTRAMUSCULAR; INTRAVENOUS
Status: DISCONTINUED | OUTPATIENT
Start: 2025-08-17 | End: 2025-08-17

## 2025-08-17 RX ORDER — METHOCARBAMOL 500 MG/1
500 TABLET, FILM COATED ORAL 4 TIMES DAILY
Qty: 20 TABLET | Refills: 0 | Status: SHIPPED | OUTPATIENT
Start: 2025-08-17 | End: 2025-08-22

## 2025-08-17 RX ORDER — KETOROLAC TROMETHAMINE 30 MG/ML
30 INJECTION, SOLUTION INTRAMUSCULAR; INTRAVENOUS
Status: COMPLETED | OUTPATIENT
Start: 2025-08-17 | End: 2025-08-17

## 2025-08-17 RX ORDER — DIAZEPAM 10 MG/2ML
5 INJECTION INTRAMUSCULAR
Status: COMPLETED | OUTPATIENT
Start: 2025-08-17 | End: 2025-08-17

## 2025-08-17 RX ADMIN — SODIUM CHLORIDE, POTASSIUM CHLORIDE, SODIUM LACTATE AND CALCIUM CHLORIDE 1000 ML: 600; 310; 30; 20 INJECTION, SOLUTION INTRAVENOUS at 02:08

## 2025-08-17 RX ADMIN — DIAZEPAM 5 MG: 5 INJECTION, SOLUTION INTRAMUSCULAR; INTRAVENOUS at 02:08

## 2025-08-17 RX ADMIN — KETOROLAC TROMETHAMINE 30 MG: 30 INJECTION, SOLUTION INTRAMUSCULAR; INTRAVENOUS at 02:08

## 2025-08-17 RX ADMIN — SODIUM CHLORIDE 1000 ML: 9 INJECTION, SOLUTION INTRAVENOUS at 01:08

## 2025-08-18 LAB
OHS QRS DURATION: 80 MS
OHS QRS DURATION: 86 MS
OHS QTC CALCULATION: 412 MS
OHS QTC CALCULATION: 418 MS

## (undated) DEVICE — SYR 30CC LUER LOCK

## (undated) DEVICE — ADHESIVE DERMABOND ADVANCED

## (undated) DEVICE — PAD ABD 8X10 STERILE

## (undated) DEVICE — GLOVE BIOGEL SKINSENSE PI 8.0

## (undated) DEVICE — IMPLANTABLE DEVICE
Type: IMPLANTABLE DEVICE | Site: KNEE | Status: NON-FUNCTIONAL
Removed: 2021-09-17

## (undated) DEVICE — SEE MEDLINE ITEM 157126

## (undated) DEVICE — SUT FIBERWIRE LOOP STRAIGHT

## (undated) DEVICE — TUBE SET INFLOW/OUTFLOW

## (undated) DEVICE — DRESSING AQUACEL AG FOAM 4X4

## (undated) DEVICE — PENCIL ROCKER SWITCH 10FT CORD

## (undated) DEVICE — DRESSING TRANS 4X4 TEGADERM

## (undated) DEVICE — DRAPE STERI INSTRUMENT 1018

## (undated) DEVICE — PROBE ARTHSCP EDGE ENERGY 50

## (undated) DEVICE — UNDERGLOVES BIOGEL PI SIZE 8.5

## (undated) DEVICE — COVER LIGHT HANDLE 80/CA

## (undated) DEVICE — QUADPRO HARVESTER

## (undated) DEVICE — NDL SUREFIRE SCORPION RC

## (undated) DEVICE — SOL 9P NACL IRR PIC IL

## (undated) DEVICE — DRESSING AQUACEL AG ADV 3.5X6

## (undated) DEVICE — SUT ETHILON 4-0 PS2 18 BLK

## (undated) DEVICE — Device

## (undated) DEVICE — BLADE SHAVER 4.5 6/BX

## (undated) DEVICE — SEE MEDLINE ITEM 146298

## (undated) DEVICE — UNDERGLOVES BIOGEL PI SZ 7 LF

## (undated) DEVICE — PAD COLD THERAPY KNEE WRAP ON

## (undated) DEVICE — SUT FIBERWIRE 2 50IN BLUE

## (undated) DEVICE — SOL IRR NACL .9% 3000ML

## (undated) DEVICE — PAD ELECTRODE STER 1.5X3

## (undated) DEVICE — GLOVE BIOGEL SKINSENSE PI 8.5

## (undated) DEVICE — KIT ACL DISP W/O SAW BLADE

## (undated) DEVICE — SKIN MARKER DEVON 160

## (undated) DEVICE — APPLICATOR CHLORAPREP ORN 26ML

## (undated) DEVICE — BLADE SHAVER LANZA 4.2X13CM

## (undated) DEVICE — BANDAGE ESMARK 6X12

## (undated) DEVICE — KIT FIXATION PERC ARTHSCP 2.9

## (undated) DEVICE — DRAPE STERI U-SHAPED 47X51IN

## (undated) DEVICE — SEE MEDLINE ITEM 157150

## (undated) DEVICE — TOURNIQUET SB QC DP 34X4IN

## (undated) DEVICE — REPAIR KIT SMALL JOINT BIO TEN

## (undated) DEVICE — GOWN SMARTSLEEVE XXL/XLONG

## (undated) DEVICE — SUT VICRYL PLUS 3-0 SH 18IN

## (undated) DEVICE — UNDERGLOVES BIOGEL PI SIZE 7.5

## (undated) DEVICE — DRILL FLIPCUTTER III

## (undated) DEVICE — BLADE SURG STAINLESS STEEL #15

## (undated) DEVICE — SHAVER SYS 5.5 ULRAFRR

## (undated) DEVICE — BIT DRILL 1.5MM

## (undated) DEVICE — GOWN SMARTGOWN LVL4 X-LONG XL

## (undated) DEVICE — GLOVE SURG ULTRA TOUCH 7

## (undated) DEVICE — DRAPE MINI C-ARM 54 X 64

## (undated) DEVICE — MAT SURGICAL ECOSUCTIONER

## (undated) DEVICE — PAD KNEE POLAR XL

## (undated) DEVICE — SUT ETHILON 3-0 PS2 18 BLK

## (undated) DEVICE — SUT MCRYL PLUS 4-0 PS2 27IN

## (undated) DEVICE — COVER MAYO STAND REINFRCD 30

## (undated) DEVICE — GLOVE PROTEXIS LTX  8.5

## (undated) DEVICE — YANKAUER OPEN TIP W/O VENT

## (undated) DEVICE — POSITIONER IV ARMBOARD FOAM

## (undated) DEVICE — GLOVE BIOGEL SKINSENSE PI 7.0

## (undated) DEVICE — TOWEL OR DISP STRL BLUE 4/PK

## (undated) DEVICE — ELECTRODE REM PLYHSV RETURN 9

## (undated) DEVICE — SEE MEDLINE ITEM 157169

## (undated) DEVICE — PENCIL ELECTROSURG HOLST W/BLD

## (undated) DEVICE — SUT VICRYL PLUS 0 CT1 18IN

## (undated) DEVICE — NDL 18GA X1 1/2 REG BEVEL